# Patient Record
Sex: FEMALE | Race: WHITE | Employment: FULL TIME | URBAN - METROPOLITAN AREA
[De-identification: names, ages, dates, MRNs, and addresses within clinical notes are randomized per-mention and may not be internally consistent; named-entity substitution may affect disease eponyms.]

---

## 2017-05-31 ENCOUNTER — PRE VISIT (OUTPATIENT)
Dept: OTOLARYNGOLOGY | Facility: CLINIC | Age: 47
End: 2017-05-31

## 2017-05-31 NOTE — TELEPHONE ENCOUNTER
1.  Date/reason for appt: 7/13/17 7:15AM - Lump in Throat/Lesion  2.  Referring provider: self-referred  3.  Call to patient (Yes / No - short description): Yes, pt transferred from call center  4.  Previous care at / records requested from: [facility] (pictures taken of her lesions here) -- NEED PARTH    Emailed PARTH form to pt: [email or web address]

## 2017-06-24 ENCOUNTER — HEALTH MAINTENANCE LETTER (OUTPATIENT)
Age: 47
End: 2017-06-24

## 2019-01-16 ENCOUNTER — TELEPHONE (OUTPATIENT)
Dept: DERMATOLOGY | Facility: CLINIC | Age: 49
End: 2019-01-16

## 2019-01-16 NOTE — TELEPHONE ENCOUNTER
Dermatology Pre-visit Call:    Reason for visit : Concerning area on arm     Any personal history of skin cancers: No    Was the patient referred: Yes, her PCP 8 months ago    If the patient was referred, are records obtained: No. (If no, then obtain records).    Has the patient seen a dermatologist in the past: No. (If yes, obtain records)    Patient Reminders Given:  --Please, make sure you bring an updated list of your medications.   --Plan on being in our facility for approximately one hour, this includes the registration process, office visit, education and check-out process.  If you are having a procedure, more time may be required.     --If you are having a procedure, please, present 15 minutes early.  --Location reviewed.   --If you need to cancel or reschedule, call [phone]  --We look forward to seeing you in Dermatology Clinic.

## 2019-01-23 ENCOUNTER — OFFICE VISIT (OUTPATIENT)
Dept: DERMATOLOGY | Facility: CLINIC | Age: 49
End: 2019-01-23
Payer: COMMERCIAL

## 2019-01-23 DIAGNOSIS — L20.89 OTHER ATOPIC DERMATITIS: Primary | ICD-10-CM

## 2019-01-23 RX ORDER — FLUOCINONIDE 0.5 MG/G
OINTMENT TOPICAL
Qty: 60 G | Refills: 3 | Status: SHIPPED | OUTPATIENT
Start: 2019-01-23 | End: 2021-10-07

## 2019-01-23 ASSESSMENT — PAIN SCALES - GENERAL: PAINLEVEL: NO PAIN (0)

## 2019-01-23 NOTE — LETTER
1/23/2019       RE: Brittanie Tavares  [address] [address]     Dear Colleague,    Thank you for referring your patient, Brittanie Tavares, to the [facility] DERMATOLOGY at [facility]. Please see a copy of my visit note below.    [facility] Dermatology Note      Dermatology Problem List:  1. Atopic dermatitis  2. 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 States she's had it for about 2 years.         Encounter Date: Jan 23, 2019    History of Present Illness:  Ms. Brittanie Tavares is a 48 year old female who with no history of skin cancer presents with an itchy spot of the right forearm for the past 2 years.  She has similar smaller patches that appear on the face.  She uses 1% hydrocortisone cream as needed to the rash areas.  It helps the face but not the arm.  She has dry cracking hands and feet.  No personal history of atopy but her brother has sinus issues.  She uses  [place]anger[place] body wash (but not on her face) and Eucerin Hand Cream on the hands but is not good at using moisturizers.  She also notes an asymptomatic blue mole on her left back present for at least 3 years without change.  A nurse recommended she have it looked at.    Past Medical History:   Patient Active Problem List   Diagnosis     Menorrhagia     Fibroids, intramural     Past Medical History:   Diagnosis Date     Anesthesia complication     Slow to awaken     Fibroids      Infertility      Sleep apnea     not formally diagnosed--was noted to have decreased O2 sats when sleeping      Past Surgical History:   Procedure Laterality Date     C TOTAL HIP ARTHROPLASTY      left     C/SECTION, CLASSICAL  1998     DILATE CERVIX, ABLATE ENDOMETRIUM HYDROTHERMAL, COMBINED N/A 2/17/2015    Procedure: COMBINED DILATE CERVIX, ABLATE  ENDOMETRIUM HYDROTHERMAL;  Surgeon: Viviana Varela MD;  [facility]     HYSTERECTOMY TOTAL ABDOMINAL N/A 3/13/2015    Procedure: HYSTERECTOMY TOTAL ABDOMINAL;  Surgeon: Viviana Varela MD;  [facility]     hysteroscopic myomectomy  2010     SALPINGECTOMY Bilateral 3/13/2015    Procedure: SALPINGECTOMY;  Surgeon: Viviana Varela MD;  [facility]       Social History:  Patient reports that she has quit smoking. she has never used smokeless tobacco. She reports that she drinks about 0.5 - 2.5 oz of alcohol per week. She reports that she does not use drugs.    Family History:  Family History   Problem Relation Age of Onset     Breast Cancer Mother      Myocardial Infarction Maternal Grandmother      Diabetes No family hx of      Coronary Artery Disease No family hx of      Hypertension No family hx of      Melanoma No family hx of      Skin Cancer No family hx of        Medications:  Current Outpatient Medications   Medication Sig Dispense Refill     hydrocortisone 1 % CREA cream daily       No Known Allergies          Physical exam:  Vitals: LMP 03/13/2015   GEN: This is a well developed, well-nourished female in no acute distress, in a pleasant mood.    SKIN: Focused examination of the face, arms, hands and back was performed.  -There are pink scaly patches and plaques on the chin and left cheek with a lichenified plaque of the right forearm.  -Xerosis of the hands with fissures  -firm white papule with blue hue left posterior shoulder  -few scattered benign appearing nevi of the back  -No other lesions of concern on areas examined.     Impression/Plan:  1. Eczematous dermatitis    Discussed gentle skin care- daily emollient, mild soaps    1% hydrocortisone cream daily prn face rash    Lidex ointment at night with cotton gloves and socks to hands and feet with saran wrap occlusion to the right forearm LSC plaque    2. 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  Follow-up in 3 months, earlier for new or changing lesions.     Staff Involved:  Staff Only    Again, thank you for allowing me to participate in the care of your patient.      Sincerely,  Maribell Quiles MD

## 2019-01-23 NOTE — PROGRESS NOTES
[facility] Dermatology Note      Dermatology Problem List:  1. Atopic dermatitis  2. 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 States she's had it for about 2 years.         Encounter Date: Jan 23, 2019    History of Present Illness:  Ms. Brittanie Tavares is a 48 year old female who with no history of skin cancer presents with an itchy spot of the right forearm for the past 2 years.  She has similar smaller patches that appear on the face.  She uses 1% hydrocortisone cream as needed to the rash areas.  It helps the face but not the arm.  She has dry cracking hands and feet.  No personal history of atopy but her brother has sinus issues.  She uses  [place] Tangerine body wash (but not on her face) and Eucerin Hand Cream on the hands but is not good at using moisturizers.  She also notes an asymptomatic blue mole on her left back present for at least 3 years without change.  A nurse recommended she have it looked at.    Past Medical History:   Patient Active Problem List   Diagnosis     Menorrhagia     Fibroids, intramural     Past Medical History:   Diagnosis Date     Anesthesia complication     Slow to awaken     Fibroids      Infertility      Sleep apnea     not formally diagnosed--was noted to have decreased O2 sats when sleeping      Past Surgical History:   Procedure Laterality Date     C TOTAL HIP ARTHROPLASTY      left     C/SECTION, CLASSICAL  1998     DILATE CERVIX, ABLATE ENDOMETRIUM HYDROTHERMAL, COMBINED N/A 2/17/2015    Procedure: COMBINED DILATE CERVIX, ABLATE ENDOMETRIUM HYDROTHERMAL;  Surgeon: Viviana Varela MD;  [facility]     HYSTERECTOMY TOTAL ABDOMINAL N/A 3/13/2015    Procedure: HYSTERECTOMY TOTAL ABDOMINAL;  Surgeon: Viviana Varela MD;  [facility]     hysteroscopic myomectomy  2010     SALPINGECTOMY Bilateral 3/13/2015     Procedure: SALPINGECTOMY;  Surgeon: Viviana Varela MD;  [facility]       Social History:  Patient reports that she has quit smoking. she has never used smokeless tobacco. She reports that she drinks about 0.5 - 2.5 oz of alcohol per week. She reports that she does not use drugs.    Family History:  Family History   Problem Relation Age of Onset     Breast Cancer Mother      Myocardial Infarction Maternal Grandmother      Diabetes No family hx of      Coronary Artery Disease No family hx of      Hypertension No family hx of      Melanoma No family hx of      Skin Cancer No family hx of        Medications:  Current Outpatient Medications   Medication Sig Dispense Refill     hydrocortisone 1 % CREA cream daily       No Known Allergies          Physical exam:  Vitals: LMP 03/13/2015   GEN: This is a well developed, well-nourished female in no acute distress, in a pleasant mood.    SKIN: Focused examination of the face, arms, hands and back was performed.  -There are pink scaly patches and plaques on the chin and left cheek with a lichenified plaque of the right forearm.  -Xerosis of the hands with fissures  -firm white papule with blue hue left posterior shoulder  -few scattered benign appearing nevi of the back  -No other lesions of concern on areas examined.     Impression/Plan:  1. Eczematous dermatitis    Discussed gentle skin care- daily emollient, mild soaps    1% hydrocortisone cream daily prn face rash    Lidex ointment at night with cotton gloves and socks to hands and feet with saran wrap occlusion to the right forearm LSC plaque      2. 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  Follow-up in 3 months, earlier for new or changing lesions.       Staff Involved:  Staff Only

## 2019-01-23 NOTE — PATIENT INSTRUCTIONS
Mild unscented soap like Dove unscented, Vanicream soap, unscented glycerin    Daily moisturizer like Vanicream, CeraVe, Eucerin    1% hydrocortisone cream for the face daily for rash  Fluocinonide ointment at bedtime under occlusion (cotton gloves, socks, or plastic wrap) for rash on body  Avoid fabric softeners or dryer sheets

## 2019-01-23 NOTE — NURSING NOTE
Dermatology Rooming Note    Brittanie Tavares's goals for this visit include:   Chief Complaint   Patient presents with     Derm Problem     Brittanie is here for a concerning area on her right forearm. States she's had it for about 2 years.       Anna Landaverde LPN

## 2019-03-13 ENCOUNTER — DOCUMENTATION ONLY (OUTPATIENT)
Dept: CARE COORDINATION | Facility: CLINIC | Age: 49
End: 2019-03-13

## 2019-10-02 ENCOUNTER — HEALTH MAINTENANCE LETTER (OUTPATIENT)
Age: 49
End: 2019-10-02

## 2019-12-27 ENCOUNTER — TELEPHONE (OUTPATIENT)
Dept: DERMATOLOGY | Facility: CLINIC | Age: 49
End: 2019-12-27

## 2019-12-27 NOTE — TELEPHONE ENCOUNTER
[facility] Call Center    Phone Message    May a detailed message be left on voicemail: yes    Reason for Call: Pt wondering if she needs to be seen, or if something could be prescribed for lesions/spots on face. Per Pt, she was prescribed a hydrocortisone cream for her face and has been using it for the last year, but it isn't working. Wondering if Dr. Quiles would be willing to prescribe something different? Pt did schedule first available appointment with Dr. Quiles in March and is added to Wait List as well. Please call.     Action Taken: Message routed to:  [facility] Dermatology Clinic

## 2019-12-27 NOTE — TELEPHONE ENCOUNTER
"Called pt back. Pt stated that she wanted to see Dr. Quiles sooner than March. Told pt that she does not have anything sooner but I could schedule her sooner with another provider to address her concerns. Pt declined and said she only wants to see Dr. Quiles and would like another prescription to try. I informed pt that she would need to be seen in clinic, as she has not been seen in 6 months. Dr. Quiles's note says \"Follow-up in 3 months, earlier for new or changing lesions.\" Stated that I could route this to Dr. Quiles so that she is informed of pt concerns, but she is not in clinic today. Pt said to do that and hung up phone.     Will route to Dr. Quiles and team.   "

## 2019-12-30 NOTE — TELEPHONE ENCOUNTER
Left voicemail asking to call clinic back, clinic number provided.    Dr. Quiles says patient can be seen in acc, at this time there is an opening next week.    Anna Landaverde LPN

## 2020-01-06 ENCOUNTER — OFFICE VISIT (OUTPATIENT)
Dept: DERMATOLOGY | Facility: CLINIC | Age: 50
End: 2020-01-06
Payer: COMMERCIAL

## 2020-01-06 DIAGNOSIS — L20.89 OTHER ATOPIC DERMATITIS: Primary | ICD-10-CM

## 2020-01-06 RX ORDER — HYDROCORTISONE 25 MG/G
OINTMENT TOPICAL 2 TIMES DAILY
Qty: 30 G | Refills: 6 | Status: SHIPPED | OUTPATIENT
Start: 2020-01-06 | End: 2020-10-06

## 2020-01-06 ASSESSMENT — PAIN SCALES - GENERAL: PAINLEVEL: NO PAIN (0)

## 2020-01-06 NOTE — LETTER
1/6/2020       RE: Brittanie Tavares  [address] [address]     Dear Colleague,    Thank you for referring your patient, Brittanie Tavares, to the [facility] DERMATOLOGY at [facility]. Please see a copy of my visit note below.    [facility] Dermatology Note      Dermatology Problem List:  1. Atopic dermatitis  2. 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          Encounter Date: Jan 6, 2020    History of Present Illness:  Ms. Brittanie Tavares is a 49 year old female who returns for follow up of atopic dermatitis.  She did well treating the body area with lidex ointment and it cleared but the face continues to flare despite using 1% hydrocortisone cream almost daily for the past year.  She uses Vanicream for her emollient and is not using soap on her face.      Past Medical History:   Patient Active Problem List   Diagnosis     Menorrhagia     Fibroids, intramural     Past Medical History:   Diagnosis Date     Anesthesia complication     Slow to awaken     Fibroids      Infertility      Sleep apnea     not formally diagnosed--was noted to have decreased O2 sats when sleeping      Past Surgical History:   Procedure Laterality Date     C TOTAL HIP ARTHROPLASTY      left     C/SECTION, CLASSICAL  1998     DILATE CERVIX, ABLATE ENDOMETRIUM HYDROTHERMAL, COMBINED N/A 2/17/2015    Procedure: COMBINED DILATE CERVIX, ABLATE ENDOMETRIUM HYDROTHERMAL;  Surgeon: Viviana Varela MD;  [facility]     HYSTERECTOMY TOTAL ABDOMINAL N/A 3/13/2015    Procedure: HYSTERECTOMY TOTAL ABDOMINAL;  Surgeon: Viviana Varela MD;  [facility]     hysteroscopic myomectomy  2010     SALPINGECTOMY Bilateral 3/13/2015    Procedure: SALPINGECTOMY;  Surgeon: Viviana Varela MD;  [facility]       Social  History:  Patient reports that she has quit smoking. She has never used smokeless tobacco. She reports current alcohol use of about 0.8 - 4.2 standard drinks of alcohol per week. She reports that she does not use drugs.    Family History:  Family History   Problem Relation Age of Onset     Breast Cancer Mother      Myocardial Infarction Maternal Grandmother      Diabetes No family hx of      Coronary Artery Disease No family hx of      Hypertension No family hx of      Melanoma No family hx of      Skin Cancer No family hx of        Medications:  Current Outpatient Medications   Medication Sig Dispense Refill     hydrocortisone 1 % CREA cream daily       fluocinonide (LIDEX) 0.05 % external ointment At bedtime under occlusion to rash on hands, feet and arms (Patient not taking: Reported on 1/6/2020) 60 g 3     No Known Allergies          Physical exam:  Vitals: LMP 03/13/2015   GEN: This is a well developed, well-nourished female in no acute distress, in a pleasant mood.    SKIN: Focused examination of the face and arm was performed.  -Caputo skin type: I  -There are pink scaly patches and plaques on the lips and cheeks.  -No other lesions of concern on areas examined.     Impression/Plan:  1. Eczematous dermatitis of the face  - She did very well with the treatment of the body and is doing gentle skin care with emollients.  We did discuss that lidex is too strong for the face.    - Will start with 2.5% hydrocortisone ointment twice daily for the face (reviewed steroid side effects on face, including glaucoma and cataracts).  I asked her to give it 2 weeks and if not improving, she will send a [employer] message to let me know and then we will send Protopic 0.1% ointment twice daily (reviewed burning/stinging, cost, and Black Box warning]    CC Maribell Quiles MD  [address] [facility] [address]  [place] [address] on close of this encounter.  Follow-up in 1 year, earlier for new or changing lesions.       Staff  Involved:  Staff Only    Again, thank you for allowing me to participate in the care of your patient.      Sincerely,    Maribell Quiles MD

## 2020-01-06 NOTE — NURSING NOTE
Dermatology Rooming Note    Brittanie Tavares's goals for this visit include:   Chief Complaint   Patient presents with     Derm Problem     Brittanie is here for a follow up for her face, states it's worse.        Anna Landaverde LPN

## 2020-01-06 NOTE — PATIENT INSTRUCTIONS
Preventive Care:    Breast Cancer Screening: During our visit today, we discussed that it is recommended you receive breast cancer screening. Please call or make an appointment with your primary care provider to discuss this with them. You may also call the  [facility] scheduling line ([phone]) to set up a mammography appointment at the [facility] within the [facility].    Preventive Care:    Colorectal Cancer Screening: During our visit today, we discussed that it is recommended you receive colorectal cancer screening. Please call or make an appointment with your primary care provider to discuss this. You may also call the  [facility] scheduling line ([phone]) to set up a colonoscopy appointment.

## 2020-01-06 NOTE — PROGRESS NOTES
[facility] Dermatology Note      Dermatology Problem List:  1. Atopic dermatitis  2. 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          Encounter Date: Jan 6, 2020    History of Present Illness:  Ms. Brittanie Tavares is a 49 year old female who returns for follow up of atopic dermatitis.  She did well treating the body area with lidex ointment and it cleared but the face continues to flare despite using 1% hydrocortisone cream almost daily for the past year.  She uses Vanicream for her emollient and is not using soap on her face.      Past Medical History:   Patient Active Problem List   Diagnosis     Menorrhagia     Fibroids, intramural     Past Medical History:   Diagnosis Date     Anesthesia complication     Slow to awaken     Fibroids      Infertility      Sleep apnea     not formally diagnosed--was noted to have decreased O2 sats when sleeping      Past Surgical History:   Procedure Laterality Date     C TOTAL HIP ARTHROPLASTY      left     C/SECTION, CLASSICAL  1998     DILATE CERVIX, ABLATE ENDOMETRIUM HYDROTHERMAL, COMBINED N/A 2/17/2015    Procedure: COMBINED DILATE CERVIX, ABLATE ENDOMETRIUM HYDROTHERMAL;  Surgeon: Viviana Varela MD;  [facility]     HYSTERECTOMY TOTAL ABDOMINAL N/A 3/13/2015    Procedure: HYSTERECTOMY TOTAL ABDOMINAL;  Surgeon: Viviana Varela MD;  [facility]     hysteroscopic myomectomy  2010     SALPINGECTOMY Bilateral 3/13/2015    Procedure: SALPINGECTOMY;  Surgeon: Viviana Varela MD;  [facility]       Social History:  Patient reports that she has quit smoking. She has never used smokeless tobacco. She reports current alcohol use of about 0.8 - 4.2 standard drinks of alcohol per week. She reports that she does not use drugs.    Family History:  Family History   Problem Relation Age of Onset      Breast Cancer Mother      Myocardial Infarction Maternal Grandmother      Diabetes No family hx of      Coronary Artery Disease No family hx of      Hypertension No family hx of      Melanoma No family hx of      Skin Cancer No family hx of        Medications:  Current Outpatient Medications   Medication Sig Dispense Refill     hydrocortisone 1 % CREA cream daily       fluocinonide (LIDEX) 0.05 % external ointment At bedtime under occlusion to rash on hands, feet and arms (Patient not taking: Reported on 1/6/2020) 60 g 3     No Known Allergies          Physical exam:  Vitals: LMP 03/13/2015   GEN: This is a well developed, well-nourished female in no acute distress, in a pleasant mood.    SKIN: Focused examination of the face and arm was performed.  -Caputo skin type: I  -There are pink scaly patches and plaques on the lips and cheeks.  -No other lesions of concern on areas examined.     Impression/Plan:  1. Eczematous dermatitis of the face  - She did very well with the treatment of the body and is doing gentle skin care with emollients.  We did discuss that lidex is too strong for the face.    - Will start with 2.5% hydrocortisone ointment twice daily for the face (reviewed steroid side effects on face, including glaucoma and cataracts).  I asked her to give it 2 weeks and if not improving, she will send a [employer] message to let me know and then we will send Protopic 0.1% ointment twice daily (reviewed burning/stinging, cost, and Black Box warning]        CC Maribell Quiles MD  [address]  [place] [address] on close of this encounter.  Follow-up in 1 year, earlier for new or changing lesions.       Staff Involved:  Staff Only

## 2020-01-15 ENCOUNTER — DOCUMENTATION ONLY (OUTPATIENT)
Dept: CARE COORDINATION | Facility: CLINIC | Age: 50
End: 2020-01-15

## 2020-02-10 ENCOUNTER — ANCILLARY PROCEDURE (OUTPATIENT)
Dept: MAMMOGRAPHY | Facility: CLINIC | Age: 50
End: 2020-02-10
Attending: STUDENT IN AN ORGANIZED HEALTH CARE EDUCATION/TRAINING PROGRAM
Payer: COMMERCIAL

## 2020-02-10 DIAGNOSIS — Z12.31 VISIT FOR SCREENING MAMMOGRAM: ICD-10-CM

## 2020-02-10 PROCEDURE — 77063 BREAST TOMOSYNTHESIS BI: CPT | Performed by: RADIOLOGY

## 2020-02-10 PROCEDURE — 77067 SCR MAMMO BI INCL CAD: CPT | Performed by: RADIOLOGY

## 2020-08-18 ENCOUNTER — TELEPHONE (OUTPATIENT)
Dept: ORTHOPEDICS | Facility: CLINIC | Age: 50
End: 2020-08-18

## 2020-08-18 NOTE — TELEPHONE ENCOUNTER
I left a message for the patient offering and earlier appt. With a different sports med provider Dr. Merida on Thursday Sep. 3rd. A call back number was given.     RAHUL Phipps

## 2020-09-03 ENCOUNTER — NURSE TRIAGE (OUTPATIENT)
Dept: NURSING | Facility: CLINIC | Age: 50
End: 2020-09-03

## 2020-09-03 NOTE — TELEPHONE ENCOUNTER
Caller called to find out what anti-inflammatory medication will be gentle on her stomach.  States that she has a shoulder issue, has been taking ibuprofen, noted that this is too harsh on her stomach,  Caller states that she has an appointment next week for the shoulder issue-declined triage at this time.  This triage nurse advised patient to call pharmacy about this and should call back if her condition worsens  Yenni Santamaria RN    Additional Information    Negative: Drug overdose and triager unable to answer question    Negative: Caller requesting information unrelated to medicine    Negative: Caller requesting a prescription for Strep throat and has a positive culture result    Negative: Rash while taking a medication or within 3 days of stopping it    Negative: Immunization reaction suspected    Negative: Asthma and having symptoms of asthma (cough, wheezing, etc.)    Negative: Breastfeeding questions about mother's medicines and diet    Negative: MORE THAN A DOUBLE DOSE of a prescription or over-the-counter (OTC) drug    Negative: DOUBLE DOSE (an extra dose or lesser amount) of over-the-counter (OTC) drug and any symptoms (e.g., dizziness, nausea, pain, sleepiness)    Negative: DOUBLE DOSE (an extra dose or lesser amount) of prescription drug and any symptoms (e.g., dizziness, nausea, pain, sleepiness)    Negative: Took another person's prescription drug    Negative: DOUBLE DOSE (an extra dose or lesser amount) of prescription drug and NO symptoms (Exception: a double dose of antibiotics)    Negative: Diabetes drug error or overdose (e.g., took wrong type of insulin or took extra dose)    Caller has medication question about med not prescribed by PCP and triager unable to answer question (e.g., compatibility with other med, storage)    Commented on: Caller has medication question, adult has minor symptoms, caller declines triage, and triager answers question     Regarding anti-inflammatory over the counter  medications    Protocols used: MEDICATION QUESTION CALL-A-OH

## 2020-09-08 DIAGNOSIS — M25.511 RIGHT SHOULDER PAIN, UNSPECIFIED CHRONICITY: Primary | ICD-10-CM

## 2020-09-09 ENCOUNTER — OFFICE VISIT (OUTPATIENT)
Dept: ORTHOPEDICS | Facility: CLINIC | Age: 50
End: 2020-09-09
Payer: COMMERCIAL

## 2020-09-09 ENCOUNTER — PRE VISIT (OUTPATIENT)
Dept: ORTHOPEDICS | Facility: CLINIC | Age: 50
End: 2020-09-09

## 2020-09-09 ENCOUNTER — ANCILLARY PROCEDURE (OUTPATIENT)
Dept: GENERAL RADIOLOGY | Facility: CLINIC | Age: 50
End: 2020-09-09
Payer: COMMERCIAL

## 2020-09-09 DIAGNOSIS — G89.29 CHRONIC RIGHT SHOULDER PAIN: Primary | ICD-10-CM

## 2020-09-09 DIAGNOSIS — M54.2 NECK PAIN: ICD-10-CM

## 2020-09-09 DIAGNOSIS — M25.511 RIGHT SHOULDER PAIN, UNSPECIFIED CHRONICITY: ICD-10-CM

## 2020-09-09 DIAGNOSIS — M25.511 CHRONIC RIGHT SHOULDER PAIN: Primary | ICD-10-CM

## 2020-09-09 NOTE — PROGRESS NOTES
Subjective:   Brittanie Tavares is a 50 year old LHD female who presents with right posterior shoulder pain.  Just kind of came on.  Can't pin point injury.  Repetitive injury, so stopped FB.  Pt reports taking ibuprofen and thought it was taking the inflammation done, ulcer induced.  Can lift overhead, hard to do bra, sunscreen on back, Full ROM put can   Pt with neck pain and numbness biking.  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  Coccyx pain, worse with raising from sitting.  Getting better this week.  Ordered pillow, divot in back.    Background:   Date of injury: None  Duration of symptoms: 5 months  Mechanism of Injury: Insidious Onset; Unknown   Intensity: 3/10 at rest, 7/10 sharp pain with activity   Aggravating factors: Sleeping right and left side, internal rotation, horizontal adduction  Relieving Factors: Ibuprofen   Prior Evaluation: None    PAST MEDICAL, SOCIAL, SURGICAL AND FAMILY HISTORY: She  has a past medical history of Anesthesia complication, Fibroids, Infertility, and Sleep apnea. She also has no past medical history of Basal cell carcinoma, Malignant melanoma (H), or Squamous cell carcinoma.  She  has a past surgical history that includes TOTAL HIP ARTHROPLASTY; hysteroscopic myomectomy (2010); c/section, classical (1998); Dilate cervix, ablate endometrium hydrothermal, combined (N/A, 2/17/2015); Hysterectomy total abdominal (N/A, 3/13/2015); and Salpingectomy (Bilateral, 3/13/2015).  Her family history includes Breast Cancer in her mother; Myocardial Infarction in her maternal grandmother.  She reports that she has quit smoking. She has never used smokeless tobacco. She reports current alcohol use of about 0.8 - 4.2 standard drinks of alcohol per week. She reports that she does not use drugs.    ALLERGIES: She has No Known Allergies.    CURRENT MEDICATIONS: She has a current medication list which includes the following prescription(s): betamethasone  dipropionate, fluocinolone, fluocinonide, hydrocortisone, and hydrocortisone.     REVIEW OF SYSTEMS: 10 point review of systems is negative except as noted above.     Exam:   LMP 03/13/2015            CONSTITUTIONAL: healthy, alert, no distress and cooperative  HEAD: Normocephalic. No masses, lesions, tenderness or abnormalities  SKIN: no suspicious lesions or rashes  GAIT: normal  NEUROLOGIC: Non-focal, Normal muscle tone and strength, reflexes normal, sensation grossly normal.  PSYCHIATRIC: affect normal/bright and mentation appears normal.    MUSCULOSKELETAL: right shoulder pain,     Palpation:  Non-tender SC joint, clavicle, AC joint, acromion, subacromial space, proximal bicep tendon   Tender:  upper trapezius muscle, posterior shoulder pain, worse with push-off - subscapularis  Range of Motion        Active: decreased due to pain, noted rounded posture        Passive: mild reduction in motion due to pain, +lift-off  Strength: rotator cuff strength mildly decreased  Special tests: Negative Neer's test, Negative Monsalve, Negative Cross-Arm adduction, Equivical Gallo's  Neck ROM intact, -Spurling's, pain posterior spinous processes     Assessment/Plan:   Patient is a 50-year-old white female with past medical history of no known shoulder trauma presenting with right shoulder pain  1.  Right shoulder pain-concern for rotator cuff arthropathy  Patient agreeable to go to physical therapy for strengthening  Consider MRI if not improving  2.  Neck pain-  I think this can also be addressed with going to physical therapy  3.  Coccyx pain-  This is recently been getting worse from sitting and possibly bike riding.  The patient has not had bike fit assessed.  Since she will be attending physical therapy I selected [facility] will also does like that and she is more than willing to bring her equipment and to have this assessed    RTC after 2 months of PT  X-RAY INTERPRETATION:   X-Ray of the Right Shoulder: 3-view, ap, y,  axillary  ordered and interpreted in the office today was negative for fracture, subluxation or joint space abnormality.

## 2020-09-09 NOTE — LETTER
9/9/2020      RE: Brittanie Tavares  [address] [address] [address]        Subjective:   Brittanie Tavares is a 50 year old LHD female who presents with right posterior shoulder pain.  Just kind of came on.  Can't pin point injury.  Repetitive injury, so stopped FB.  Pt reports taking ibuprofen and thought it was taking the inflammation done, ulcer induced.  Can lift overhead, hard to do bra, sunscreen on back, Full ROM put can   Pt with neck pain and numbness biking.  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  Coccyx pain, worse with raising from sitting.  Getting better this week.  Ordered pillow, divot in back.    Background:   Date of injury: None  Duration of symptoms: 5 months  Mechanism of Injury: Insidious Onset; Unknown   Intensity: 3/10 at rest, 7/10 sharp pain with activity   Aggravating factors: Sleeping right and left side, internal rotation, horizontal adduction  Relieving Factors: Ibuprofen   Prior Evaluation: None    PAST MEDICAL, SOCIAL, SURGICAL AND FAMILY HISTORY: She  has a past medical history of Anesthesia complication, Fibroids, Infertility, and Sleep apnea. She also has no past medical history of Basal cell carcinoma, Malignant melanoma (H), or Squamous cell carcinoma.  She  has a past surgical history that includes TOTAL HIP ARTHROPLASTY; hysteroscopic myomectomy (2010); c/section, classical (1998); Dilate cervix, ablate endometrium hydrothermal, combined (N/A, 2/17/2015); Hysterectomy total abdominal (N/A, 3/13/2015); and Salpingectomy (Bilateral, 3/13/2015).  Her family history includes Breast Cancer in her mother; Myocardial Infarction in her maternal grandmother.  She reports that she has quit smoking. She has never used smokeless tobacco. She reports current alcohol use of about 0.8 - 4.2 standard drinks of alcohol per week. She reports that she does not use drugs.    ALLERGIES: She has No Known Allergies.    CURRENT MEDICATIONS: She  has a current medication list which includes the following prescription(s): betamethasone dipropionate, fluocinolone, fluocinonide, hydrocortisone, and hydrocortisone.     REVIEW OF SYSTEMS: 10 point review of systems is negative except as noted above.     Exam:   LMP 03/13/2015            CONSTITUTIONAL: healthy, alert, no distress and cooperative  HEAD: Normocephalic. No masses, lesions, tenderness or abnormalities  SKIN: no suspicious lesions or rashes  GAIT: normal  NEUROLOGIC: Non-focal, Normal muscle tone and strength, reflexes normal, sensation grossly normal.  PSYCHIATRIC: affect normal/bright and mentation appears normal.    MUSCULOSKELETAL: right shoulder pain,     Palpation:  Non-tender SC joint, clavicle, AC joint, acromion, subacromial space, proximal bicep tendon   Tender:  upper trapezius muscle, posterior shoulder pain, worse with push-off - subscapularis  Range of Motion        Active: decreased due to pain, noted rounded posture        Passive: mild reduction in motion due to pain, +lift-off  Strength: rotator cuff strength mildly decreased  Special tests: Negative Neer's test, Negative Monsalev, Negative Cross-Arm adduction, Equivical Gallo's  Neck ROM intact, -Spurling's, pain posterior spinous processes     Assessment/Plan:   Patient is a 50-year-old white female with past medical history of no known shoulder trauma presenting with right shoulder pain  1.  Right shoulder pain-concern for rotator cuff arthropathy  Patient agreeable to go to physical therapy for strengthening  Consider MRI if not improving  2.  Neck pain-  I think this can also be addressed with going to physical therapy  3.  Coccyx pain-  This is recently been getting worse from sitting and possibly bike riding.  The patient has not had bike fit assessed.  Since she will be attending physical therapy I selected [facility] will also does like that and she is more than willing to bring her equipment and to have this assessed    RTC  after 2 months of PT  X-RAY INTERPRETATION:   X-Ray of the Right Shoulder: 3-view, ap, y, axillary  ordered and interpreted in the office today was negative for fracture, subluxation or joint space abnormality.    Isabella Rosenbaum MD

## 2020-09-21 ENCOUNTER — THERAPY VISIT (OUTPATIENT)
Dept: PHYSICAL THERAPY | Facility: CLINIC | Age: 50
End: 2020-09-21
Attending: FAMILY MEDICINE
Payer: COMMERCIAL

## 2020-09-21 DIAGNOSIS — M25.511 CHRONIC RIGHT SHOULDER PAIN: ICD-10-CM

## 2020-09-21 DIAGNOSIS — G89.29 CHRONIC RIGHT SHOULDER PAIN: ICD-10-CM

## 2020-09-21 DIAGNOSIS — M54.2 NECK PAIN: ICD-10-CM

## 2020-09-21 PROCEDURE — 97161 PT EVAL LOW COMPLEX 20 MIN: CPT | Mod: GP | Performed by: PHYSICAL THERAPIST

## 2020-09-21 PROCEDURE — 97110 THERAPEUTIC EXERCISES: CPT | Mod: GP | Performed by: PHYSICAL THERAPIST

## 2020-09-21 NOTE — PROGRESS NOTES
[facility] Initial Evaluation  Subjective:  [facility]                  Physical Therapy Initial Examination/Evaluation  September 21, 2020    Brittanie Tavares is a 50 year old female referred to physical therapy for treatment of chronic right shoulder and neck pain with Precautions/Restrictions/MD instructions none  Patient has been sewing a bit   advil-hx of ulcers    Subjective:  DOI/onset: 5/1/20   Acute Injury or Gradual Onset?: Gradual injury over time  Mechanism of Injury: unknown  Related PMH: left WILFREDO  Chief Complaint/Functional Limitations:   Reaching behind back to clasp bra, sleeping on side causes the pain and see below in therapy evaluation codes   Pain: working/rest 2 /10, activity 9/10 Location: post/lateral shoulder Frequency: Constant Described as: aching and sharp Alleviated by: Nothing Progression of Symptoms: Gradually getting better. Time of day when pain is worse: Activity related and Position related  Sleeping: Interrupted due to current issue   Occupation: tech  Job duties: keyboarding/computer use  Current HEP/exercise regimen: biking, 30-40 miles/week, don't ride in the winter  Patient's goals are see chief complaints control shoulder pain    Other pertinent PMH/Red Flags: left WILFREDO   Barriers at home/work: none  Pertinent Surgical History: see above  Medications: see above  General health as reported by patient: good  Return to MD:  Not at this time      Objective:  System          Objective:    Off the Bike measures, as indicated    Flexibility Screen:    Right Left   pec major + +   Post capsule + +   Hamstring     Hip Flexor     ITB/Lat Hip in SL     Quadriceps     Gastroc/ Soleus     - = normal  + = mild tightness  ++ = moderate tightness  +++ = significant tightness       Static bike measures measurements:    Seat Level: measure off  if possible Initial ( -11.5degrees) Post  ( -1.5degrees)               Knee Left/right    Knee flex angle (seat height) 35 degrees/mm        Seat Position  Seat Fore/aft (slightly ant positioning,        Trunk angle 85   Shoulder angle (humerus, T1-7 with axis at GH, goal around 90)    Shoulder width (Good width-some difficulty reaching breaks)      left right   Elbow (flexion angle) 5 degrees 5 degrees   Wrist position:       Stem sizing occurred 80 mm length and +20 rise        Clinical assessment and changes:    Pt present with persistent shoulder pain RTC tendinopathy.  Pts bike set up and sewing are aggravating factors.  Pt had an overly tilted seat and not as high of an angled stem.  Adjustments were made and recommendations described.  Pt will be a great candidate for follow up in 1 week for progression of shoulder exercises.  Pt provided cross body add stretch 2 x 20 seconds 2 times a day      Shoulder ROM pain at 90 flex but can achieve full motion-right  Pain at 90 with abd  Greatest pain with IR up the back and beltline position vs T8 on the left  MMT: pain with flex>ER> abdright  Negative labral  +impingment        Physical Exam    General     ROS    Assessment/Plan:    Patient is a 50 year old female with right side shoulder complaints.    Patient has the following significant findings with corresponding treatment plan.                Diagnosis 1:  Right RTC tendinopathy  Pain -  hot/cold therapy, manual therapy, self management, education and home program  Decreased strength - therapeutic exercise and therapeutic activities  Decreased function - therapeutic activities  Impaired posture - neuro re-education    Therapy Evaluation Codes:   1) History comprised of:   Personal factors that impact the plan of care:      None.    Comorbidity factors that impact the plan of care are:      None.     Medications impacting care: None.  2) Examination of Body Systems comprised of:   Body structures and functions that impact the plan of care:      Shoulder.   Activity limitations that impact the plan of care are:      Sitting, Sports and  Sleeping.  3) Clinical presentation characteristics are:   Stable/Uncomplicated.  4) Decision-Making    Low complexity using standardized patient assessment instrument and/or measureable assessment of functional outcome.  Cumulative Therapy Evaluation is: Low complexity.    Previous and current functional limitations:  (See Goal Flow Sheet for this information)    Short term and Long term goals: (See Goal Flow Sheet for this information)     Communication ability:  Patient appears to be able to clearly communicate and understand verbal and written communication and follow directions correctly.  Treatment Explanation - The following has been discussed with the patient:   RX ordered/plan of care  Anticipated outcomes  Possible risks and side effects  This patient would benefit from PT intervention to resume normal activities.   Rehab potential is good.    Frequency:  1 X week, once daily  Duration:  for 8 weeks  Discharge Plan:  Achieve all LTG.  Independent in home treatment program.  Reach maximal therapeutic benefit.    Please refer to the daily flowsheet for treatment today, total treatment time and time spent performing 1:1 timed codes.

## 2020-09-29 ENCOUNTER — THERAPY VISIT (OUTPATIENT)
Dept: PHYSICAL THERAPY | Facility: CLINIC | Age: 50
End: 2020-09-29
Payer: COMMERCIAL

## 2020-09-29 DIAGNOSIS — S49.90XA SHOULDER INJURY: Primary | ICD-10-CM

## 2020-09-29 PROCEDURE — 97110 THERAPEUTIC EXERCISES: CPT | Mod: GP | Performed by: PHYSICAL THERAPIST

## 2020-09-29 PROCEDURE — 97140 MANUAL THERAPY 1/> REGIONS: CPT | Mod: GP | Performed by: PHYSICAL THERAPIST

## 2020-10-06 ENCOUNTER — OFFICE VISIT (OUTPATIENT)
Dept: DERMATOLOGY | Facility: CLINIC | Age: 50
End: 2020-10-06
Payer: COMMERCIAL

## 2020-10-06 ENCOUNTER — THERAPY VISIT (OUTPATIENT)
Dept: PHYSICAL THERAPY | Facility: CLINIC | Age: 50
End: 2020-10-06
Payer: COMMERCIAL

## 2020-10-06 DIAGNOSIS — L20.89 OTHER ATOPIC DERMATITIS: ICD-10-CM

## 2020-10-06 DIAGNOSIS — S49.90XA SHOULDER INJURY: Primary | ICD-10-CM

## 2020-10-06 DIAGNOSIS — L21.9 DERMATITIS, SEBORRHEIC: Primary | ICD-10-CM

## 2020-10-06 PROCEDURE — 97140 MANUAL THERAPY 1/> REGIONS: CPT | Mod: GP | Performed by: PHYSICAL THERAPIST

## 2020-10-06 PROCEDURE — 99213 OFFICE O/P EST LOW 20 MIN: CPT | Mod: GC | Performed by: DERMATOLOGY

## 2020-10-06 PROCEDURE — 97110 THERAPEUTIC EXERCISES: CPT | Mod: GP | Performed by: PHYSICAL THERAPIST

## 2020-10-06 RX ORDER — FLUOCINOLONE ACETONIDE 0.11 MG/ML
OIL TOPICAL
Qty: 118 ML | Refills: 2 | Status: SHIPPED | OUTPATIENT
Start: 2020-10-06 | End: 2021-10-07

## 2020-10-06 RX ORDER — KETOCONAZOLE 20 MG/ML
SHAMPOO TOPICAL
Qty: 120 ML | Refills: 5 | Status: SHIPPED | OUTPATIENT
Start: 2020-10-06

## 2020-10-06 RX ORDER — HYDROCORTISONE 25 MG/G
OINTMENT TOPICAL 2 TIMES DAILY
Qty: 30 G | Refills: 6 | Status: SHIPPED | OUTPATIENT
Start: 2020-10-06 | End: 2021-10-07

## 2020-10-06 RX ORDER — KETOCONAZOLE 20 MG/G
CREAM TOPICAL
Qty: 30 G | Refills: 2 | Status: SHIPPED | OUTPATIENT
Start: 2020-10-06 | End: 2021-10-07

## 2020-10-06 ASSESSMENT — PAIN SCALES - GENERAL: PAINLEVEL: NO PAIN (0)

## 2020-10-06 NOTE — NURSING NOTE
Dermatology Rooming Note    Brittanie Tavares's goals for this visit include:   Chief Complaint   Patient presents with     Derm Problem     Brittanie is here for a follow up for dermatitis on her face and scalp.  States it's getting worse.       Anna Landaverde LPN

## 2020-10-06 NOTE — LETTER
10/6/2020       RE: Brittanie Tavares  [address] [facility] [address]     Dear Colleague,    Thank you for referring your patient, Brittanie Tavares, to the [facility] [place] [facility]. Please see a copy of my visit note below.      [facility] Dermatology Note      Dermatology Problem List:  1. Atopic dermatitis   - HC 2.5% ointment  2. Seborrheic dermatitis, possible perioral dermatitis component  - ketoconazole shampoo, ketoconazole 2% cream, fluocinolone 0.01% oil  3. 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  States it's getting worse.       History of Present Illness:  Ms. Brittanie Tavares is a 50 year old female who presents as a follow-up for dermatitis of the face and scalp. The patient was last seen on 1/6/20 when she was prescribed hydrocortisone 2.5% ointment for her dermatitis. She has since done well with this ointment for the body, especially under the occlusion, however has noticed that her dermatitis of the face and scalp has persisted. She was subsequently prescribed fluocinolone solution, however this caused some stinging of the scalp. She reports increased pruritis and flaking of the occipital scalp, brow area, perinasal region and perioral region. These areas seem to worsen with stress.    Past Medical History:   Patient Active Problem List   Diagnosis     Menorrhagia     Fibroids, intramural     Past Medical History:   Diagnosis Date     Anesthesia complication     Slow to awaken     Fibroids      Infertility      Sleep apnea     not formally diagnosed--was noted to have decreased O2 sats when sleeping      Past Surgical History:   Procedure Laterality Date     C TOTAL HIP ARTHROPLASTY      left     C/SECTION, CLASSICAL   1998     DILATE CERVIX, ABLATE ENDOMETRIUM HYDROTHERMAL, COMBINED N/A 2/17/2015    Procedure: COMBINED DILATE CERVIX, ABLATE ENDOMETRIUM HYDROTHERMAL;  Surgeon: Viviana Varela MD;  Location: [facility] OR     HYSTERECTOMY TOTAL ABDOMINAL N/A 3/13/2015    Procedure: HYSTERECTOMY TOTAL ABDOMINAL;  Surgeon: Viviana Varela MD;  Location: [facility] OR     hysteroscopic myomectomy  2010     SALPINGECTOMY Bilateral 3/13/2015    Procedure: SALPINGECTOMY;  Surgeon: Viviana Varela MD;  Location: [facility] OR       Social History:  Patient reports that she has quit smoking. She has never used smokeless tobacco. She reports current alcohol use of about 0.8 - 4.2 standard drinks of alcohol per week. She reports that she does not use drugs.    Family History:  Family History   Problem Relation Age of Onset     Breast Cancer Mother      Myocardial Infarction Maternal Grandmother      Diabetes No family hx of      Coronary Artery Disease No family hx of      Hypertension No family hx of      Melanoma No family hx of      Skin Cancer No family hx of        Medications:  Current Outpatient Medications   Medication Sig Dispense Refill     hydrocortisone 1 % CREA cream daily       hydrocortisone 2.5 % ointment Apply topically 2 times daily 30 g 6     betamethasone dipropionate (DIPROSONE) 0.05 % external lotion Apply topically 2 times daily (Patient not taking: Reported on 10/6/2020) 60 mL 3     fluocinolone (SYNALAR) 0.01 % solution Apply topically 2 times daily (Patient not taking: Reported on 10/6/2020) 60 mL 6     fluocinonide (LIDEX) 0.05 % external ointment At bedtime under occlusion to rash on hands, feet and arms (Patient not taking: Reported on 10/6/2020) 60 g 3        No Known Allergies    Review of Systems:  -Skin Establ Pt: The patient denies any new rash, pruritus, or lesions that are symptomatic, changing or bleeding, except as per HPI.  -Const: Denies fevers, chills or changes in weight.   -Constitutional: Otherwise feeling  well today, in usual state of health.  -HEENT: Patient denies nonhealing oral sores.  -Skin: As above in HPI. No additional skin concerns.    Physical exam:  Vitals: LMP 03/13/2015   GEN: This is a well developed, well-nourished female in no acute distress, in a pleasant mood.    SKIN: Focused examination of the face and scalp was performed.  -Caputo skin type: II  -There is macular erythema with few small pink papules of the brows, perinasal, perioral areas and occipital scalp with mild flaky white scale.  -No other lesions of concern on areas examined.     Impression/Plan:  1. Seborrheic dermatitis with possible perioral dermatitis component  Discussed the etiology of seborrheic dermatitis, including that it is not contagious, and also reviewed available treatment options including topical antifungals and corticosteroids. We also discussed that given the distribution, there is a possibility that she may have a component of perioral dermatitis, however she reports no history of pustules in these areas. We discussed that should she fail to improve with our new treatment plan, she may require a topical antibiotic as well.  - Start ketoconazole 2% shampoo 2-3 times per week  - Start fluocinolone 0.01% oil nightly under occlusion with shower cap for 2 weeks then as needed for acute flares  - Start ketoconazole 2% cream BID on the face  - Consider topical antibiotic if no improvement with this treatment plan    2. Atopic dermatitis, well controlled  - Continue HC 2.5% with or without occlusion BID as needed, refills provided  - Continue gentle skincare    CC Referred Self, MD  No address on file on close of this encounter.    Follow-up in 3 months, earlier for new or changing lesions.     Dr. Henderson staffed the patient.    Staff Involved:  Miladys Gunter DO (PGY-2)/Staff  I, Maribell Quiles MD, saw this patient with the resident and agree with the resident s findings and plan of care as documented in the  resident s note.

## 2020-10-06 NOTE — PROGRESS NOTES
[facility] Dermatology Note      Dermatology Problem List:  1. Atopic dermatitis   - HC 2.5% ointment  2. Seborrheic dermatitis, possible perioral dermatitis component  - ketoconazole shampoo, ketoconazole 2% cream, fluocinolone 0.01% oil  3. 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  States it's getting worse.       History of Present Illness:  Ms. Brittanie Tavares is a 50 year old female who presents as a follow-up for dermatitis of the face and scalp. The patient was last seen on 1/6/20 when she was prescribed hydrocortisone 2.5% ointment for her dermatitis. She has since done well with this ointment for the body, especially under the occlusion, however has noticed that her dermatitis of the face and scalp has persisted. She was subsequently prescribed fluocinolone solution, however this caused some stinging of the scalp. She reports increased pruritis and flaking of the occipital scalp, brow area, perinasal region and perioral region. These areas seem to worsen with stress.    Past Medical History:   Patient Active Problem List   Diagnosis     Menorrhagia     Fibroids, intramural     Past Medical History:   Diagnosis Date     Anesthesia complication     Slow to awaken     Fibroids      Infertility      Sleep apnea     not formally diagnosed--was noted to have decreased O2 sats when sleeping      Past Surgical History:   Procedure Laterality Date     C TOTAL HIP ARTHROPLASTY      left     C/SECTION, CLASSICAL  1998     DILATE CERVIX, ABLATE ENDOMETRIUM HYDROTHERMAL, COMBINED N/A 2/17/2015    Procedure: COMBINED DILATE CERVIX, ABLATE ENDOMETRIUM HYDROTHERMAL;  Surgeon: Viviana Varela MD;  Location: [facility] OR     HYSTERECTOMY TOTAL ABDOMINAL N/A 3/13/2015    Procedure: HYSTERECTOMY TOTAL ABDOMINAL;  Surgeon: Avery  Viviana Escobar MD;  Location: [facility] OR     hysteroscopic myomectomy  2010     SALPINGECTOMY Bilateral 3/13/2015    Procedure: SALPINGECTOMY;  Surgeon: Viviana Varela MD;  Location: [facility] OR       Social History:  Patient reports that she has quit smoking. She has never used smokeless tobacco. She reports current alcohol use of about 0.8 - 4.2 standard drinks of alcohol per week. She reports that she does not use drugs.    Family History:  Family History   Problem Relation Age of Onset     Breast Cancer Mother      Myocardial Infarction Maternal Grandmother      Diabetes No family hx of      Coronary Artery Disease No family hx of      Hypertension No family hx of      Melanoma No family hx of      Skin Cancer No family hx of        Medications:  Current Outpatient Medications   Medication Sig Dispense Refill     hydrocortisone 1 % CREA cream daily       hydrocortisone 2.5 % ointment Apply topically 2 times daily 30 g 6     betamethasone dipropionate (DIPROSONE) 0.05 % external lotion Apply topically 2 times daily (Patient not taking: Reported on 10/6/2020) 60 mL 3     fluocinolone (SYNALAR) 0.01 % solution Apply topically 2 times daily (Patient not taking: Reported on 10/6/2020) 60 mL 6     fluocinonide (LIDEX) 0.05 % external ointment At bedtime under occlusion to rash on hands, feet and arms (Patient not taking: Reported on 10/6/2020) 60 g 3        No Known Allergies    Review of Systems:  -Skin Establ Pt: The patient denies any new rash, pruritus, or lesions that are symptomatic, changing or bleeding, except as per HPI.  -Const: Denies fevers, chills or changes in weight.   -Constitutional: Otherwise feeling well today, in usual state of health.  -HEENT: Patient denies nonhealing oral sores.  -Skin: As above in HPI. No additional skin concerns.    Physical exam:  Vitals: LMP 03/13/2015   GEN: This is a well developed, well-nourished female in no acute distress, in a pleasant mood.    SKIN: Focused examination of the  face and scalp was performed.  -Caputo skin type: II  -There is macular erythema with few small pink papules of the brows, perinasal, perioral areas and occipital scalp with mild flaky white scale.  -No other lesions of concern on areas examined.     Impression/Plan:  1. Seborrheic dermatitis with possible perioral dermatitis component  Discussed the etiology of seborrheic dermatitis, including that it is not contagious, and also reviewed available treatment options including topical antifungals and corticosteroids. We also discussed that given the distribution, there is a possibility that she may have a component of perioral dermatitis, however she reports no history of pustules in these areas. We discussed that should she fail to improve with our new treatment plan, she may require a topical antibiotic as well.  - Start ketoconazole 2% shampoo 2-3 times per week  - Start fluocinolone 0.01% oil nightly under occlusion with shower cap for 2 weeks then as needed for acute flares  - Start ketoconazole 2% cream BID on the face  - Consider topical antibiotic if no improvement with this treatment plan    2. Atopic dermatitis, well controlled  - Continue HC 2.5% with or without occlusion BID as needed, refills provided  - Continue gentle skincare    CC Referred Self, MD  No address on file on close of this encounter.    Follow-up in 3 months, earlier for new or changing lesions.     Dr. Henderson staffed the patient.    Staff Involved:  Miladys Gunter DO (PGY-2)/Staff  I, Maribell Quiles MD, saw this patient with the resident and agree with the resident s findings and plan of care as documented in the resident s note.

## 2020-10-06 NOTE — PATIENT INSTRUCTIONS
Seborrheic Dermatitis    Seborrheic dermatitis is a skin condition that causes redness, scaly or flaky patches, and sometimes itching. It usually affects areas with many oil glands. These include the scalp, face, upper chest, and back. Dandruff is a mild type of seborrheic dermatitis. It is caused by a normal yeast that lives in our oil glands. This condition is not contiguous.      Wash hair with ketoconazole 2% shampoo 2-3 times a week. Leave medication on scalp for 5 minutes before rinsing off. Can alternate this with Head & Shoulders shampoo or any other over the counter anti-dandruff shampoos.     Massage fluocinolone oil to the scalp nightly and leave on overnight under a shower cap. Wash off in the morning.     Apply ketoconazole cream to the red and/or scaly areas of face twice daily.

## 2020-10-23 ENCOUNTER — THERAPY VISIT (OUTPATIENT)
Dept: PHYSICAL THERAPY | Facility: CLINIC | Age: 50
End: 2020-10-23
Payer: COMMERCIAL

## 2020-10-23 ENCOUNTER — TELEPHONE (OUTPATIENT)
Dept: ORTHOPEDICS | Facility: CLINIC | Age: 50
End: 2020-10-23

## 2020-10-23 DIAGNOSIS — G89.29 CHRONIC RIGHT SHOULDER PAIN: Primary | ICD-10-CM

## 2020-10-23 DIAGNOSIS — M25.511 CHRONIC RIGHT SHOULDER PAIN: Primary | ICD-10-CM

## 2020-10-23 DIAGNOSIS — S49.90XA SHOULDER INJURY: Primary | ICD-10-CM

## 2020-10-23 PROCEDURE — 97530 THERAPEUTIC ACTIVITIES: CPT | Mod: GP | Performed by: PHYSICAL THERAPIST

## 2020-10-23 NOTE — PROGRESS NOTES
S:  Pt reports to PT with increased pain and decreased function.  No real mechanism-potentially some garage cleaning.  Pt has stopped PT exercises and would like to follow up with MD-specialist.  Average pain level 6/10 lateral and post shoulder.  This pain awakens her at night at times.  Pain is mostly present with using the arm.  Significant pain and difficulty with reaching behind back.  O:  Right shoulder MMT  Pain and decreased strength abd>IR>ER  Min pain scaption and flex  Right shoulder AROM  Pain with supine flex and lacking 10 decreases  Significant pain with abd greater than 110 degrees  A:  Pt has not made progress with PT focused at mobility and strength, postural ed.  Pt will benefit from return to MD for potentially additional testing at this time.  P: hold PT

## 2020-10-23 NOTE — TELEPHONE ENCOUNTER
[facility] Call Center    Phone Message    May a detailed message be left on voicemail: yes     Reason for Call: Other: Patient was referred from her PT to see Dr. Rosenbaum today for her increased arm pain. Patient is wondering if she could have some imaging done prior to her appt. Patient was informed that an order would probably be required, and that i would put in the request. If patient cannot get imaigng prior to her appt, can somebody please call her to inform her, and help her reschedule or send her to scheduling to do so. thank you     Action Taken: Message routed to:  [facility] sports med    Travel Screening: Not Applicable

## 2020-10-23 NOTE — TELEPHONE ENCOUNTER
Returned call to patient, informing her that we could proceed with x-rays prior to her scheduled visit with Dr. Rosenbaum today. She states that she has already done x-rays and would like an MRI completed before she sees Dr. Rosenbaum today. She was informed that we would not be able to have an MRI done the same day as the appointment, due to scheduling the MRI and waiting for the radiologist to read the images. An order for the MRI was placed and she was given the phone number to Radiology to schedule. Her appointment for today was cancelled and she will call back to reschedule her visit, once she knows when her MRI is. She had no further questions.

## 2020-10-26 ENCOUNTER — TELEPHONE (OUTPATIENT)
Dept: ORTHOPEDICS | Facility: CLINIC | Age: 50
End: 2020-10-26

## 2020-10-26 ENCOUNTER — HOSPITAL ENCOUNTER (OUTPATIENT)
Dept: MRI IMAGING | Facility: CLINIC | Age: 50
Discharge: HOME | End: 2020-10-26
Attending: FAMILY MEDICINE | Admitting: FAMILY MEDICINE
Payer: COMMERCIAL

## 2020-10-26 DIAGNOSIS — G89.29 CHRONIC RIGHT SHOULDER PAIN: ICD-10-CM

## 2020-10-26 DIAGNOSIS — M25.511 CHRONIC RIGHT SHOULDER PAIN: ICD-10-CM

## 2020-10-26 PROCEDURE — 73221 MRI JOINT UPR EXTREM W/O DYE: CPT | Mod: RT

## 2020-10-26 PROCEDURE — 73221 MRI JOINT UPR EXTREM W/O DYE: CPT | Mod: 26 | Performed by: RADIOLOGY

## 2020-10-26 NOTE — TELEPHONE ENCOUNTER
[facility] Call Center    Phone Message    May a detailed message be left on voicemail: yes     Reason for Call: Other: first appt available with scheduling is 11/11/20 patient concerned about her shoulder pain and would like to see if she can be seen sooner or called with results. Please call her back at [phone]      Action Taken: Message routed to:  [facility] ortho    Travel Screening: Not Applicable

## 2020-10-27 ENCOUNTER — MYC MEDICAL ADVICE (OUTPATIENT)
Dept: ORTHOPEDICS | Facility: CLINIC | Age: 50
End: 2020-10-27

## 2020-10-27 DIAGNOSIS — G89.29 CHRONIC RIGHT SHOULDER PAIN: Primary | ICD-10-CM

## 2020-10-27 DIAGNOSIS — M25.511 CHRONIC RIGHT SHOULDER PAIN: Primary | ICD-10-CM

## 2020-10-27 NOTE — TELEPHONE ENCOUNTER
I attempted to call with results.  Mailbox is full.  I think we have an appt for her tomorrow.    Melissa

## 2020-10-27 NOTE — TELEPHONE ENCOUNTER
We can do a telephone call.  She has access to her results.  Yes, I'm sending her to shoulder surgery.  Melissa

## 2020-10-27 NOTE — TELEPHONE ENCOUNTER
----- Message from Seth Weeks, PT sent at 10/23/2020 10:57 AM CDT -----  Regarding: pt follow up  Brittanie Ac is not getting better.  Shoulder sxs are really problematic.  Possible MRI?  She is going to be contacting your group for follow up.  Seth    S:  Pt reports to PT with increased pain and decreased function.  No real mechanism-potentially some garage cleaning.  Pt has stopped PT exercises and would like to follow up with MD-specialist.  Average pain level 6/10 lateral and post shoulder.  This pain awakens her at night at times.  Pain is mostly present with using the arm.  Significant pain and difficulty with reaching behind back.  O:  Right shoulder MMT  Pain and decreased strength abd>IR>ER  Min pain scaption and flex  Right shoulder AROM  Pain with supine flex and lacking 10 decreases  Significant pain with abd greater than 110 degrees  A:  Pt has not made progress with PT focused at mobility and strength, postural ed.  Pt will benefit from return to MD for potentially additional testing at this time.  P: hold PT

## 2020-10-28 ENCOUNTER — VIRTUAL VISIT (OUTPATIENT)
Dept: ORTHOPEDICS | Facility: CLINIC | Age: 50
End: 2020-10-28
Payer: COMMERCIAL

## 2020-10-28 DIAGNOSIS — M25.511 CHRONIC RIGHT SHOULDER PAIN: Primary | ICD-10-CM

## 2020-10-28 DIAGNOSIS — G89.29 CHRONIC RIGHT SHOULDER PAIN: Primary | ICD-10-CM

## 2020-10-28 PROCEDURE — 99213 OFFICE O/P EST LOW 20 MIN: CPT | Mod: 95 | Performed by: FAMILY MEDICINE

## 2020-10-28 RX ORDER — TRAMADOL HYDROCHLORIDE 50 MG/1
50 TABLET ORAL EVERY 6 HOURS PRN
Qty: 15 TABLET | Refills: 0 | Status: SHIPPED | OUTPATIENT
Start: 2020-10-28 | End: 2020-10-31

## 2020-10-28 NOTE — LETTER
"  10/28/2020      RE: Brittanie Tavares  [address] [address]       Brittanie Tavares is a 50 year old female who is being evaluated via a billable video visit.      The patient has been notified of following:     \"This video visit will be conducted via a call between you and your physician/provider. We have found that certain health care needs can be provided without the need for an in-person physical exam.  This service lets us provide the care you need with a video conversation.  If a prescription is necessary we can send it directly to your pharmacy.  If lab work is needed we can place an order for that and you can then stop by our lab to have the test done at a later time.    Video visits are billed at different rates depending on your insurance coverage.  Please reach out to your insurance provider with any questions.    If during the course of the call the physician/provider feels a video visit is not appropriate, you will not be charged for this service.\"    Patient has given verbal consent for Video visit? Yes  How would you like to obtain your AVS? MyChart  If you are dropped from the video visit, the video invite should be resent to: Text to cell phone: [phone]  Will anyone else be joining your video visit? No      Video-Visit Details  Pt is a 51 yo female with PMHx of right hip pain and she is currently struggling with increased right shoulder pain.  Patient did have an MRI after weeks of PT with Seth GEORGE without improvement.  Findings on MRI are consistent with a supraspinatus tear and infraspinatus involvement.  Patient is having difficulty sleeping.    Patient is a 50-year-old female with worsening right shoulder pain especially at night  1.  Rotator cuff tears -supraspinatus and infraspinatus-  Patient willing to see shoulder surgery in consultation  Tramadol 50 mg at night    RTC for consideration of CSI if not having surgical procedure    Type of service:  Video " Visit    Video Start Time: 11:37 AM  Video End Time: 11:50 AM    Originating Location (pt. Location): Home    Distant Location (provider location):  [facility] SP[facility]TS MEDIC[facility]     Platform used for Video Visit: Jocelin Rosenbaum MD

## 2020-10-28 NOTE — PROGRESS NOTES
"Brittanie Tavares is a 50 year old female who is being evaluated via a billable video visit.      The patient has been notified of following:     \"This video visit will be conducted via a call between you and your physician/provider. We have found that certain health care needs can be provided without the need for an in-person physical exam.  This service lets us provide the care you need with a video conversation.  If a prescription is necessary we can send it directly to your pharmacy.  If lab work is needed we can place an order for that and you can then stop by our lab to have the test done at a later time.    Video visits are billed at different rates depending on your insurance coverage.  Please reach out to your insurance provider with any questions.    If during the course of the call the physician/provider feels a video visit is not appropriate, you will not be charged for this service.\"    Patient has given verbal consent for Video visit? Yes  How would you like to obtain your AVS? MyChart  If you are dropped from the video visit, the video invite should be resent to: Text to cell phone: [phone]  Will anyone else be joining your video visit? No      Video-Visit Details  Pt is a 51 yo female with PMHx of right hip pain and she is currently struggling with increased right shoulder pain.  Patient did have an MRI after weeks of PT with Seth GEORGE without improvement.  Findings on MRI are consistent with a supraspinatus tear and infraspinatus involvement.  Patient is having difficulty sleeping.    Patient is a 50-year-old female with worsening right shoulder pain especially at night  1.  Rotator cuff tears -supraspinatus and infraspinatus-  Patient willing to see shoulder surgery in consultation  Tramadol 50 mg at night    RTC for consideration of CSI if not having surgical procedure    Type of service:  Video Visit    Video Start Time: 11:37 AM  Video End Time: 11:50 AM    Originating Location (pt. Location): " Home    Distant Location (provider location):  [facility]IC[facility]     Platform used for Video Visit: Jocelin Rosenbaum MD

## 2020-10-28 NOTE — LETTER
"10/28/2020       RE: Brittanie Tavares  [address] [address]     Dear Colleague,    Thank you for referring your patient, Brittanie Tavares, to the [facility] CLINIC [place] [facility]. Please see a copy of my visit note below.    Brittanie Tavares is a 50 year old female who is being evaluated via a billable video visit.      The patient has been notified of following:     \"This video visit will be conducted via a call between you and your physician/provider. We have found that certain health care needs can be provided without the need for an in-person physical exam.  This service lets us provide the care you need with a video conversation.  If a prescription is necessary we can send it directly to your pharmacy.  If lab work is needed we can place an order for that and you can then stop by our lab to have the test done at a later time.    Video visits are billed at different rates depending on your insurance coverage.  Please reach out to your insurance provider with any questions.    If during the course of the call the physician/provider feels a video visit is not appropriate, you will not be charged for this service.\"    Patient has given verbal consent for Video visit? Yes  How would you like to obtain your AVS? MyChart  If you are dropped from the video visit, the video invite should be resent to: Text to cell phone: [phone]  Will anyone else be joining your video visit? No      Video-Visit Details  Pt is a 49 yo female with PMHx of right hip pain and she is currently struggling with increased right shoulder pain.  Patient did have an MRI after weeks of PT with Seth GEORGE without improvement.  Findings on MRI are consistent with a supraspinatus tear and infraspinatus involvement.  Patient is having difficulty sleeping.    Patient is a 50-year-old female with worsening right shoulder pain especially at night  1.  Rotator " cuff tears -supraspinatus and infraspinatus-  Patient willing to see shoulder surgery in consultation  Tramadol 50 mg at night    RTC for consideration of CSI if not having surgical procedure    Type of service:  Video Visit    Video Start Time: 11:37 AM  Video End Time: 11:50 AM    Originating Location (pt. Location): Home    Distant Location (provider location):  [facility]     Platform used for Video Visit: Jocelin Rosenbaum MD

## 2020-11-04 NOTE — TELEPHONE ENCOUNTER
RECORDS RECEIVED FROM: Torn rotator cuff (R) shoulder/ [facility]   DATE RECEIVED: Dec 4, 2020     NOTES STATUS DETAILS   OFFICE NOTE from referring provider Internal    OFFICE NOTE from other specialist N/A    DISCHARGE SUMMARY from hospital N/A    DISCHARGE REPORT from the ER N/A    OPERATIVE REPORT N/A    MEDICATION LIST Internal    IMPLANT RECORD/STICKER N/A    LABS     CBC/DIFF N/A    CULTURES N/A    INJECTIONS DONE IN RADIOLOGY N/A    MRI Internal    CT SCAN N/A    XRAYS (IMAGES & REPORTS) Internal    TUMOR     PATHOLOGY  Slides & report N/A      11/04/20   11:52 AM   Complete  Tonya Ramirez CMA

## 2020-12-04 ENCOUNTER — PRE VISIT (OUTPATIENT)
Dept: ORTHOPEDICS | Facility: CLINIC | Age: 50
End: 2020-12-04

## 2021-01-15 ENCOUNTER — HEALTH MAINTENANCE LETTER (OUTPATIENT)
Age: 51
End: 2021-01-15

## 2021-05-15 ENCOUNTER — HEALTH MAINTENANCE LETTER (OUTPATIENT)
Age: 51
End: 2021-05-15

## 2021-06-01 ENCOUNTER — OFFICE VISIT (OUTPATIENT)
Dept: DERMATOLOGY | Facility: CLINIC | Age: 51
End: 2021-06-01
Payer: COMMERCIAL

## 2021-06-01 DIAGNOSIS — K13.0 LIP FISSURE: ICD-10-CM

## 2021-06-01 DIAGNOSIS — L21.9 DERMATITIS, SEBORRHEIC: Primary | ICD-10-CM

## 2021-06-01 PROCEDURE — 99214 OFFICE O/P EST MOD 30 MIN: CPT | Mod: GC | Performed by: DERMATOLOGY

## 2021-06-01 RX ORDER — TACROLIMUS 1 MG/G
OINTMENT TOPICAL 2 TIMES DAILY
Qty: 60 G | Refills: 2 | Status: SHIPPED | OUTPATIENT
Start: 2021-06-01 | End: 2022-03-17

## 2021-06-01 RX ORDER — CLINDAMYCIN PHOSPHATE 10 UG/ML
LOTION TOPICAL 2 TIMES DAILY
Qty: 60 ML | Refills: 2 | Status: SHIPPED | OUTPATIENT
Start: 2021-06-01 | End: 2021-10-07

## 2021-06-01 ASSESSMENT — PAIN SCALES - GENERAL: PAINLEVEL: NO PAIN (0)

## 2021-06-01 NOTE — PROGRESS NOTES
[facility] Dermatology Note  Encounter Date: Jun 1, 2021  Office Visit     Dermatology Problem List:  1. Atopic dermatitis   - HC 2.5% ointment  2. Seborrheic dermatitis, possible perioral dermatitis component  - using pimecrolimus cream for face (obtained from outside Derm), augmented betamethasone diproprionate 0.05% gel for scalp  - says ketoconazole shampoo/cream was not effective  3. 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 No active areas today for a culture as patient was hoping for.   - continue calcineurin inhibitor, but change to tacrolimus ointment and use once to twice weekly  - continue augmented betamethasone diproprionate gel twice weekly  - if patient has flaring of dermatitis, she should call to make appointment with Dr. Tierney for biopsy only    # Recurrent lesion of left upper lip at vermillion border.   Only very subtle papule on exam today. Concern would be for HSV infection vs secondary infection of eczema/fissure. Recommend that patient present to PCP or urgent care next time this flares for a HSV PCR swab of lesion. Also for patient to take picture if flaring to send into Derm.     Follow-up: 1 year(s) in-person, or earlier for new or changing lesions    Staff and Resident:     Dr. Tierney staffed the patient.     Joaquin Geller MD, PhD  Med-Derm PGY-5    I have seen and examined this patient and agree with the assessment and plan as documented in the resident's note.    Gianfranco Tierney MD  Dermatology Attending    ____________________________________________    CC: Derm Problem (Here to follow up on dermatitis )    HPI:  Ms. Brittanie Tavares is a(n) 51 year old female who presents today as a return patient for evaluation of red scaly  plaques on the posterior scalp and also over her eyebrows, nasal sulci and superior and inferior cutaneous lips. She last saw Dr. Quiles in Oct 2020. Since that time she also saw an outside Dermatologist and is currently using pimecrolimus cream for her face and augmented betamethasone 0.05% cream for the scalp. She is only using these a couple times a week. Doesn't like pimecrolimus because it causes her face to sting.  She is also concerned today about a spot on her left upper lip at the vermillion border that comes and goes. She says it gets tingly and is painful. She applies bacitracin to it and it seems to go away. She denies a history of cold sores, but says she has family members who have cold sores.     Patient is otherwise feeling well, without additional skin concerns.    Labs Reviewed:  N/A    Physical Exam:  Vitals: LMP 03/13/2015   SKIN: Focused examination of scalp, face, hands was performed.  - there is a pink plaque with minimal scale on the occipital scalp  - there is a subtle light pink plaque above the left eyebrow  - there is a subtle scaly papule at the left upper lip on the vermillion border  - No other lesions of concern on areas examined.     Medications:  Current Outpatient Medications   Medication     betamethasone dipropionate (DIPROSONE) 0.05 % external lotion     fluocinolone (SYNALAR) 0.01 % solution     fluocinolone acetonide (DERMA SMOOTHE/FS BODY) 0.01 % external oil     fluocinonide (LIDEX) 0.05 % external ointment     hydrocortisone 1 % CREA cream     hydrocortisone 2.5 % ointment     ketoconazole (NIZORAL) 2 % external cream     ketoconazole (NIZORAL) 2 % external shampoo     metroNIDAZOLE (METROCREAM) 0.75 % external cream     No current facility-administered medications for this visit.       Past Medical History:   Patient Active Problem List   Diagnosis     Menorrhagia     Fibroids, intramural     Past Medical History:   Diagnosis Date     Anesthesia complication     Slow to  awaken     Fibroids      Infertility      Sleep apnea     not formally diagnosed--was noted to have decreased O2 sats when sleeping        CC Referred Self, MD  No address on file on close of this encounter.

## 2021-06-01 NOTE — PATIENT INSTRUCTIONS
- continue using betamethasone diproprionate 0.05% gel for scalp  - continue using pimecrolimus cream for face  - may start using clindamycin 1% lotion for

## 2021-06-01 NOTE — NURSING NOTE
Dermatology Rooming Note    Brittanie Tavares's goals for this visit include:   Chief Complaint   Patient presents with     Derm Problem     Here to follow up on dermatitis    Rhianna Devries RN

## 2021-06-01 NOTE — LETTER
6/1/2021       RE: Brittanie Tavares  [address] [facility] [id number]     Dear Colleague,    Thank you for referring your patient, Brittanie Tavares, to the [facility] DERMATOLOGY [facility] [place] at [facility]. Please see a copy of my visit note below.    [facility] Dermatology Note  Encounter Date: Jun 1, 2021  Office Visit     Dermatology Problem List:  1. Atopic dermatitis   - HC 2.5% ointment  2. Seborrheic dermatitis, possible perioral dermatitis component  - using pimecrolimus cream for face (obtained from outside Derm), augmented betamethasone diproprionate 0.05% gel for scalp  - says ketoconazole shampoo/cream was not effective  3. 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 No active areas today for a culture as patient was hoping for.   - continue calcineurin inhibitor, but change to tacrolimus ointment and use once to twice weekly  - continue augmented betamethasone diproprionate gel twice weekly  - if patient has flaring of dermatitis, she should call to make appointment with Dr. Tierney for biopsy only    # Recurrent lesion of left upper lip at vermillion border.   Only very subtle papule on exam today. Concern would be for HSV infection vs secondary infection of eczema/fissure. Recommend that patient present to PCP or urgent care next time this flares for a HSV PCR swab of lesion. Also for patient to take picture if flaring to send into Derm.     Follow-up: 1 year(s) in-person, or earlier for new or changing lesions    Staff and Resident:     Dr. Tierney staffed the patient.     Joaquin Geller MD, PhD  Med-Derm PGY-5    I have seen and examined this patient and agree with the assessment  and plan as documented in the resident's note.    Gianfranco Tierney MD  Dermatology Attending    ____________________________________________    CC: Derm Problem (Here to follow up on dermatitis )    HPI:  Ms. Brittanie Tavares is a(n) 51 year old female who presents today as a return patient for evaluation of red scaly plaques on the posterior scalp and also over her eyebrows, nasal sulci and superior and inferior cutaneous lips. She last saw Dr. Quiles in Oct 2020. Since that time she also saw an outside Dermatologist and is currently using pimecrolimus cream for her face and augmented betamethasone 0.05% cream for the scalp. She is only using these a couple times a week. Doesn't like pimecrolimus because it causes her face to sting.  She is also concerned today about a spot on her left upper lip at the vermillion border that comes and goes. She says it gets tingly and is painful. She applies bacitracin to it and it seems to go away. She denies a history of cold sores, but says she has family members who have cold sores.     Patient is otherwise feeling well, without additional skin concerns.    Labs Reviewed:  N/A    Physical Exam:  Vitals: LMP 03/13/2015   SKIN: Focused examination of scalp, face, hands was performed.  - there is a pink plaque with minimal scale on the occipital scalp  - there is a subtle light pink plaque above the left eyebrow  - there is a subtle scaly papule at the left upper lip on the vermillion border  - No other lesions of concern on areas examined.     Medications:  Current Outpatient Medications   Medication     betamethasone dipropionate (DIPROSONE) 0.05 % external lotion     fluocinolone (SYNALAR) 0.01 % solution     fluocinolone acetonide (DERMA SMOOTHE/FS BODY) 0.01 % external oil     fluocinonide (LIDEX) 0.05 % external ointment     hydrocortisone 1 % CREA cream     hydrocortisone 2.5 % ointment     ketoconazole (NIZORAL) 2 % external cream     ketoconazole (NIZORAL) 2 % external  shampoo     metroNIDAZOLE (METROCREAM) 0.75 % external cream     No current facility-administered medications for this visit.       Past Medical History:   Patient Active Problem List   Diagnosis     Menorrhagia     Fibroids, intramural     Past Medical History:   Diagnosis Date     Anesthesia complication     Slow to awaken     Fibroids      Infertility      Sleep apnea     not formally diagnosed--was noted to have decreased O2 sats when sleeping        CC Referred Self, MD  No address on file on close of this encounter.

## 2021-06-17 PROBLEM — K13.0 LIP FISSURE: Status: ACTIVE | Noted: 2021-06-17

## 2021-06-17 PROBLEM — L21.9 DERMATITIS, SEBORRHEIC: Status: ACTIVE | Noted: 2021-06-17

## 2021-09-04 ENCOUNTER — HEALTH MAINTENANCE LETTER (OUTPATIENT)
Age: 51
End: 2021-09-04

## 2021-09-15 ENCOUNTER — ANCILLARY PROCEDURE (OUTPATIENT)
Dept: MAMMOGRAPHY | Facility: CLINIC | Age: 51
End: 2021-09-15
Attending: FAMILY MEDICINE
Payer: COMMERCIAL

## 2021-09-15 ENCOUNTER — TELEPHONE (OUTPATIENT)
Dept: OBGYN | Facility: CLINIC | Age: 51
End: 2021-09-15

## 2021-09-15 DIAGNOSIS — Z12.31 VISIT FOR SCREENING MAMMOGRAM: ICD-10-CM

## 2021-09-15 PROCEDURE — 77067 SCR MAMMO BI INCL CAD: CPT | Mod: TC | Performed by: RADIOLOGY

## 2021-09-15 PROCEDURE — 77063 BREAST TOMOSYNTHESIS BI: CPT | Mod: TC | Performed by: RADIOLOGY

## 2021-09-15 NOTE — TELEPHONE ENCOUNTER
ANALI and salpingectomy in 2015.  Wants to know if she is still supposed to have exams.  Advised breast exam and pelvic to assess ovaries.  If no concerns, could be followed by PCP

## 2021-09-15 NOTE — TELEPHONE ENCOUNTER
----- Message from Kathryn Soria RN sent at 9/15/2021  1:02 PM CDT -----  Regarding: had surgery with Dr laird  Supposed to have exams?  Not sure of the follow up.  Pt cant see visits in [facility]

## 2021-10-06 NOTE — PROGRESS NOTES
Progress Note    SUBJECTIVE:  Brittanie Tavares is an 51 year old, , who requests a breast and pelvic exam.  Patient is followed by Dr. Haley for primary care.  Brittanie had a ANALI with bilateral salpingectomy in  for heavy menstrual bleeding and fibroids.     Concerns today include:     1. Low libido and difficulty with arousal: No intercourse for about 1 yr after hysterectomy due to recovery time; when she did become sexually active, she reports low libido and difficulty with arousal. Had pain at first, but denies dyspareunia now. Reports good relationship, same partner since surgery (). Denies mental health concerns. Expresses low interest in sexual activity and difficulty becoming aroused which are bothersome to patient. Feels that she went through menopause in the days after her surgery. Has questions about whether her ovaries remain in place or if they were removed during her ANALI with salpingectomy surgery.     2. Nocturia & incontinence, only at night: Reports having to get up to urinate 3-4 times per night. When she doesn't get up that frequently, bladder is often full when she does get up and will occasionally have incontinence.  Does not have incontinence any other time of the day. Does not drink caffeine. Drinks ~16 oz of water after dinner and several glasses throughout the day. Denies dysuria, fever, flank pain.  No daytime symptoms.       Menstrual History:  Menstrual History 2015 2015 3/13/2015   LAST MENSTRUAL PERIOD 2015 2015 3/13/2015       Last pap smear: prior to hysterectomy   History of abnormal Pap smear: no hx of ODELL 2 or greater; Status post benign hysterectomy. Health Maintenance and Surgical History updated.    Mammogram: 9/15/2021 normal result    HISTORY:  Prescription Medications as of 10/15/2021       Rx Number Disp Refills Start End Last Dispensed Date Next Fill Date Owning Pharmacy    desoximetasone (TOPICORT) 0.25 % external cream            Sig:  Apply 1 Application topically    Class: Historical    Route: Topical    ketoconazole (NIZORAL) 2 % external shampoo  120 mL 5 10/6/2020    [id number] [place], [place] - [address]    Sig: Massage into scalp, let sit 3-5 min, then rinse. Use 2-3 times per week    Class: E-Prescribe    tacrolimus (PROTOPIC) 0.1 % external ointment  60 g 2 2021    [id number] [address]    Sig: Apply topically 2 times daily    Class: E-Prescribe    Route: Topical    Prior authorization: Closed - Prior Authorization not required for patient/medication        No Known Allergies  Immunization History   Administered Date(s) Administered     COVID-19,PF,Pfizer 2021, 2021       OB History    Para Term  AB Living   1 1 1 0 0 1   SAB TAB Ectopic Multiple Live Births   0 0 0 0 0     Past Medical History:   Diagnosis Date     Anemia     Eventually resolved following hysterectomy     Anesthesia complication     Slow to awaken     Fibroid     Resolved by hysterectomy     Fibroids      History of blood transfusion     Needed due to fibroids     Infertility      Sleep apnea     not formally diagnosed--was noted to have decreased O2 sats when sleeping      Varicella     Had when I was a child     Past Surgical History:   Procedure Laterality Date     ABDOMEN SURGERY  1998    C section     C TOTAL HIP ARTHROPLASTY      left     C/SECTION, CLASSICAL       DILATE CERVIX, ABLATE ENDOMETRIUM HYDROTHERMAL, COMBINED N/A 2015    Procedure: COMBINED DILATE CERVIX, ABLATE ENDOMETRIUM HYDROTHERMAL;  Surgeon: Viviana Varela MD;  Location: [facility] OR     HYSTERECTOMY TOTAL ABDOMINAL N/A 3/13/2015    Procedure: HYSTERECTOMY TOTAL ABDOMINAL;  Surgeon: Viviana Varela MD;  Location: [facility] OR     hysteroscopic myomectomy  2010     SALPINGECTOMY Bilateral 3/13/2015    Procedure: SALPINGECTOMY;  Surgeon: Viviana Varela MD;  Location:  [facility] OR     Family History   Problem Relation Age of Onset     Breast Cancer Mother      Substance Abuse Father      Depression Father      Diabetes Father      Myocardial Infarction Maternal Grandmother      Coronary Artery Disease Maternal Grandmother      Hypertension Maternal Grandmother      Anxiety Disorder Brother      Mental Illness Brother      Substance Abuse Brother      Depression Brother      Diabetes No family hx of      Coronary Artery Disease No family hx of      Hypertension No family hx of      Melanoma No family hx of      Skin Cancer No family hx of      Social History     Socioeconomic History     Marital status:      Spouse name: None     Number of children: None     Years of education: None     Highest education level: None   Occupational History     Occupation: tech support     Employer: [employer]   Tobacco Use     Smoking status: Former Smoker     Packs/day: 0.00     Years: 19.00     Pack years: 0.00     Start date: 3/31/1986     Quit date: 3/12/2005     Years since quittin.5     Smokeless tobacco: Never Used     Tobacco comment: quit    Substance and Sexual Activity     Alcohol use: Yes     Alcohol/week: 0.8 - 4.2 standard drinks     Comment: couple drinks per day on average     Drug use: No     Sexual activity: Yes     Partners: Male     Birth control/protection: Post-menopausal, Female Surgical   Other Topics Concern     Parent/sibling w/ CABG, MI or angioplasty before 65F 55M? Not Asked   Social History Narrative    1/14/15    , one child    Works at a desk.    Viviana Varela MD     Social Determinants of Health     Financial Resource Strain:      Difficulty of Paying Living Expenses:    Food Insecurity:      Worried About Running Out of Food in the Last Year:      Ran Out of Food in the Last Year:    Transportation Needs:      Lack of Transportation (Medical):      Lack of Transportation (Non-Medical):    Physical Activity:      Days of Exercise per Week:       Minutes of Exercise per Session:    Stress:      Feeling of Stress :    Social Connections:      Frequency of Communication with Friends and Family:      Frequency of Social Gatherings with Friends and Family:      Attends L[employer] day Services:      Active Member of Clubs or Organizations:      Attends Club or Organization Meetings:      Marital Status:    Intimate Partner Violence:      Fear of Current or Ex-Partner:      Emotionally Abused:      Physically Abused:      Sexually Abused:        Review of Systems     Constitutional:  Negative for fever, chills, weight loss, weight gain, fatigue, decreased appetite, night sweats, recent stressors, height gain, height loss, post-operative complications, incisional pain, hallucinations, increased energy, hyperactivity and confused.   HENT:  Negative for ear pain, hearing loss, tinnitus, nosebleeds, trouble swallowing, hoarse voice, mouth sores, sore throat, ear discharge, tooth pain, gum tenderness, taste disturbance, smell disturbance, hearing aid, bleeding gums, dry mouth, sinus pain, sinus congestion and neck mass.    Eyes:  Negative for double vision, pain, redness, eye pain, decreased vision, eye watering, eye bulging, eye dryness, flashing lights, spots, floaters, strabismus, tunnel vision, jaundice and eye irritation.   Respiratory:   Negative for cough, hemoptysis, sputum production, shortness of breath, wheezing, sleep disturbances due to breathing, snores loudly, respiratory pain, dyspnea on exertion, cough disturbing sleep and postural dyspnea.    Cardiovascular:  Negative for chest pain, dyspnea on exertion, palpitations, orthopnea, claudication, leg swelling, fingers/toes turn blue, hypertension, hypotension, syncope, history of heart murmur, chest pain on exertion, chest pain at rest, pacemaker, few scattered varicosities, leg pain, sleep disturbances due to breathing, tachycardia, light-headedness, exercise intolerance and edema.   Gastrointestinal:   "Positive for heartburn, diarrhea, constipation and change in stool. Negative for nausea, vomiting, abdominal pain, blood in stool, melena, rectal pain, bloating, bowel incontinence, jaundice and coffee ground emesis.   Genitourinary:  Positive for bladder incontinence, decreased libido, nocturia and arousal difficulty. Negative for dysuria, urgency, hematuria, flank pain, vaginal discharge, difficulty urinating, genital sores, dyspareunia, voiding less frequently, abnormal vaginal bleeding, excessive menstruation, menstrual changes, hot flashes, vaginal dryness and postmenopausal bleeding.   Musculoskeletal:  Negative for myalgias, back pain, joint swelling, arthralgias, stiffness, muscle cramps, neck pain, bone pain, muscle weakness and fracture.   Skin:  Negative for nail changes, itching, poor wound healing, rash, hair changes, skin changes, acne, warts, poor wound healing, scarring, flaky skin, Raynaud's phenomenon, sensitivity to sunlight and skin thickening.   Neurological:  Negative for dizziness, tingling, tremors, speech change, seizures, loss of consciousness, weakness, light-headedness, numbness, headaches, disturbances in coordination, extremity numbness, memory loss, difficulty walking and paralysis.   Endo/Heme:  Negative for anemia, swollen glands and bruises/bleeds easily.   Psychiatric/Behavioral:  Negative for depression, hallucinations, memory loss, decreased concentration, mood swings and panic attacks.    Breast:  Negative for breast discharge, breast mass, breast pain and nipple retraction.   Endocrine:  Negative for altered temperature regulation, polyphagia, polydipsia, unwanted hair growth and change in facial hair.    Answers for HPI/ROS submitted by the patient on 10/7/2021  MINA 7 TOTAL SCORE: 0    EXAM:  Blood pressure 112/77, pulse 63, height 1.651 m (5' 5\"), weight 94.7 kg (208 lb 12.8 oz), last menstrual period 03/13/2015, not currently breastfeeding. Body mass index is 34.75 " kg/m .  General appearance: Pleasant female in no acute distress.     BREAST EXAM:  Breast: Without visible skin changes. No dimpling or lesions seen.   Breasts supple, non-tender with palpation, no dominant mass, nodularity, or nipple discharge noted bilaterally. Axillary nodes negative.      PELVIC EXAM:  EG/BUS: Normal genital architecture without lesions, erythema or abnormal secretions; Bartholin's, Urethra, [place]'s normal; pelvic floor weakness with Kegal   Urethral meatus: normal   Urethra: no masses, tenderness, or scarring   Bladder: no masses or tenderness   Cervix: surgically absent; vaginal cuff intact  Uterus: surgically absent  Adnexa: Not palpable    ASSESSMENT:  Encounter Diagnoses   Name Primary?     Screen for STD (sexually transmitted disease)      Low libido      Urgency incontinence      Encounter for gynecological examination (general) (routine) without abnormal findings Yes        PLAN:   Orders Placed This Encounter   Procedures     [facility] Referral     Physical Therapy Referral     1. Low libido: Referred pt to [facility]; counseled on many factors that may play a role in libido and arousal.   2. Nocturia/ Incontinence at night: Recommended limiting intake of fluids after dinner; recommended Kegal exercises and pelvic floor PT for management of incontinence. Offered referral to urology, pt declined at this time.  3. Preventive health: Counseled pt that her ovaries remain in place and clarified op note and pathology findings with pt.  Counseled on recommendations re: gyn preventive health. Recommend yearly clinical breast exam and mammograms (currently up to date); limited evidence for routine pelvic exams; recommend pelvic exams if symptoms present. No need for further pap smears. STD testing completed today.      Return in one year/PRN for preventive care or problems/concerns.     Verbalized understanding and agreement with visit plan.  Rachel Trammell,  DNP, APRN, WHNP

## 2021-10-07 ENCOUNTER — OFFICE VISIT (OUTPATIENT)
Dept: OBGYN | Facility: CLINIC | Age: 51
End: 2021-10-07
Attending: NURSE PRACTITIONER
Payer: COMMERCIAL

## 2021-10-07 VITALS
SYSTOLIC BLOOD PRESSURE: 112 MMHG | DIASTOLIC BLOOD PRESSURE: 77 MMHG | HEIGHT: 65 IN | WEIGHT: 208.8 LBS | HEART RATE: 63 BPM | BODY MASS INDEX: 34.79 KG/M2

## 2021-10-07 DIAGNOSIS — R68.82 LOW LIBIDO: ICD-10-CM

## 2021-10-07 DIAGNOSIS — Z11.3 SCREEN FOR STD (SEXUALLY TRANSMITTED DISEASE): ICD-10-CM

## 2021-10-07 DIAGNOSIS — Z01.419 ENCOUNTER FOR GYNECOLOGICAL EXAMINATION WITHOUT ABNORMAL FINDING: ICD-10-CM

## 2021-10-07 DIAGNOSIS — N39.41 URGENCY INCONTINENCE: ICD-10-CM

## 2021-10-07 DIAGNOSIS — Z01.419 ENCOUNTER FOR GYNECOLOGICAL EXAMINATION (GENERAL) (ROUTINE) WITHOUT ABNORMAL FINDINGS: Primary | ICD-10-CM

## 2021-10-07 PROCEDURE — G0463 HOSPITAL OUTPT CLINIC VISIT: HCPCS

## 2021-10-07 PROCEDURE — 87491 CHLMYD TRACH DNA AMP PROBE: CPT | Performed by: NURSE PRACTITIONER

## 2021-10-07 PROCEDURE — 87591 N.GONORRHOEAE DNA AMP PROB: CPT | Performed by: NURSE PRACTITIONER

## 2021-10-07 PROCEDURE — G0101 CA SCREEN;PELVIC/BREAST EXAM: HCPCS | Performed by: NURSE PRACTITIONER

## 2021-10-07 RX ORDER — DESOXIMETASONE 2.5 MG/G
1 CREAM TOPICAL
COMMUNITY

## 2021-10-07 ASSESSMENT — ENCOUNTER SYMPTOMS
ORTHOPNEA: 0
COUGH DISTURBING SLEEP: 0
SINUS PAIN: 0
HEARTBURN: 1
TREMORS: 0
EYE REDNESS: 0
SHORTNESS OF BREATH: 0
LEG SWELLING: 0
CONSTIPATION: 1
SORE THROAT: 0
POSTURAL DYSPNEA: 0
HEMOPTYSIS: 0
TINGLING: 0
DIZZINESS: 0
HALLUCINATIONS: 0
SINUS CONGESTION: 0
FEVER: 0
ALTERED TEMPERATURE REGULATION: 0
PANIC: 0
DYSPNEA ON EXERTION: 0
HYPOTENSION: 0
SPEECH CHANGE: 0
LOSS OF CONSCIOUSNESS: 0
JAUNDICE: 0
DECREASED APPETITE: 0
SPUTUM PRODUCTION: 0
PARALYSIS: 0
EYE WATERING: 0
EXERCISE INTOLERANCE: 0
TACHYCARDIA: 0
INCREASED ENERGY: 0
BLOATING: 0
EYE PAIN: 0
SWOLLEN GLANDS: 0
FLANK PAIN: 0
SEIZURES: 0
SLEEP DISTURBANCES DUE TO BREATHING: 0
WHEEZING: 0
RECTAL PAIN: 0
NECK PAIN: 0
WEIGHT GAIN: 0
WEAKNESS: 0
EYE IRRITATION: 0
LIGHT-HEADEDNESS: 0
HOARSE VOICE: 0
BLOOD IN STOOL: 0
ARTHRALGIAS: 0
MUSCLE CRAMPS: 0
NIGHT SWEATS: 0
DIARRHEA: 1
PALPITATIONS: 0
SMELL DISTURBANCE: 0
HEMATURIA: 0
DECREASED CONCENTRATION: 0
POOR WOUND HEALING: 0
INSOMNIA: 0
SKIN CHANGES: 0
EXTREMITY NUMBNESS: 0
DISTURBANCES IN COORDINATION: 0
SYNCOPE: 0
BREAST PAIN: 0
MUSCLE WEAKNESS: 0
CHILLS: 0
LEG PAIN: 0
NAUSEA: 0
HOT FLASHES: 0
TROUBLE SWALLOWING: 0
BACK PAIN: 0
COUGH: 0
VOMITING: 0
BREAST MASS: 0
DEPRESSION: 0
BOWEL INCONTINENCE: 0
HEADACHES: 0
POLYPHAGIA: 0
RESPIRATORY PAIN: 0
NECK MASS: 0
DYSURIA: 0
FATIGUE: 0
DECREASED LIBIDO: 1
HYPERTENSION: 0
NERVOUS/ANXIOUS: 0
MEMORY LOSS: 0
STIFFNESS: 0
DOUBLE VISION: 0
TASTE DISTURBANCE: 0
WEIGHT LOSS: 0
MYALGIAS: 0
NUMBNESS: 0
ABDOMINAL PAIN: 0
DIFFICULTY URINATING: 0
SNORES LOUDLY: 0
NAIL CHANGES: 0
POLYDIPSIA: 0
JOINT SWELLING: 0
BRUISES/BLEEDS EASILY: 0
CLAUDICATION: 0

## 2021-10-07 ASSESSMENT — ANXIETY QUESTIONNAIRES
GAD7 TOTAL SCORE: 0
5. BEING SO RESTLESS THAT IT IS HARD TO SIT STILL: NOT AT ALL
GAD7 TOTAL SCORE: 0
4. TROUBLE RELAXING: NOT AT ALL
1. FEELING NERVOUS, ANXIOUS, OR ON EDGE: NOT AT ALL
2. NOT BEING ABLE TO STOP OR CONTROL WORRYING: NOT AT ALL
8. IF YOU CHECKED OFF ANY PROBLEMS, HOW DIFFICULT HAVE THESE MADE IT FOR YOU TO DO YOUR WORK, TAKE CARE OF THINGS AT HOME, OR GET ALONG WITH OTHER PEOPLE?: NOT DIFFICULT AT ALL
7. FEELING AFRAID AS IF SOMETHING AWFUL MIGHT HAPPEN: NOT AT ALL
GAD7 TOTAL SCORE: 0
7. FEELING AFRAID AS IF SOMETHING AWFUL MIGHT HAPPEN: NOT AT ALL
6. BECOMING EASILY ANNOYED OR IRRITABLE: NOT AT ALL
5. BEING SO RESTLESS THAT IT IS HARD TO SIT STILL: NOT AT ALL
6. BECOMING EASILY ANNOYED OR IRRITABLE: NOT AT ALL
1. FEELING NERVOUS, ANXIOUS, OR ON EDGE: NOT AT ALL
2. NOT BEING ABLE TO STOP OR CONTROL WORRYING: NOT AT ALL
7. FEELING AFRAID AS IF SOMETHING AWFUL MIGHT HAPPEN: NOT AT ALL
3. WORRYING TOO MUCH ABOUT DIFFERENT THINGS: NOT AT ALL
GAD7 TOTAL SCORE: 0
3. WORRYING TOO MUCH ABOUT DIFFERENT THINGS: NOT AT ALL

## 2021-10-07 ASSESSMENT — PATIENT HEALTH QUESTIONNAIRE - PHQ9: 5. POOR APPETITE OR OVEREATING: NOT AT ALL

## 2021-10-07 ASSESSMENT — MIFFLIN-ST. JEOR: SCORE: 1557.99

## 2021-10-07 NOTE — LETTER
10/7/2021       RE: Brittanie Tavares  [address] [facility] [address]     Dear Colleague,    Thank you for referring your patient, Brittanie Tavares, to the [facility] [place] at [facility]. Please see a copy of my visit note below.      Progress Note    SUBJECTIVE:  Brittanie Tavares is an 51 year old, , who requests a breast and pelvic exam.  Patient is followed by Dr. Haley for primary care.  Brittanie had a ANALI with bilateral salpingectomy in  for heavy menstrual bleeding and fibroids.     Concerns today include:     1. Low libido and difficulty with arousal: No intercourse for about 1 yr after hysterectomy due to recovery time; when she did become sexually active, she reports low libido and difficulty with arousal. Had pain at first, but denies dyspareunia now. Reports good relationship, same partner since surgery (). Denies mental health concerns. Expresses low interest in sexual activity and difficulty becoming aroused which are bothersome to patient. Feels that she went through menopause in the days after her surgery. Has questions about whether her ovaries remain in place or if they were removed during her ANALI with salpingectomy surgery.     2. Nocturia & incontinence, only at night: Reports having to get up to urinate 3-4 times per night. When she doesn't get up that frequently, bladder is often full when she does get up and will occasionally have incontinence.  Does not have incontinence any other time of the day. Does not drink caffeine. Drinks ~16 oz of water after dinner and several glasses throughout the day. Denies dysuria, fever, flank pain.  No daytime symptoms.       Menstrual History:  Menstrual History 2015 2015 3/13/2015   LAST MENSTRUAL PERIOD 2015 2015 3/13/2015       Last pap smear: prior to hysterectomy   History of abnormal Pap smear: no hx of ODELL 2 or greater; Status post  benign hysterectomy. Health Maintenance and Surgical History updated.    Mammogram: 9/15/2021 normal result    HISTORY:  Prescription Medications as of 10/15/2021       Rx Number Disp Refills Start End Last Dispensed Date Next Fill Date Owning Pharmacy    desoximetasone (TOPICORT) 0.25 % external cream            Sig: Apply 1 Application topically    Class: Historical    Route: Topical    ketoconazole (NIZORAL) 2 % external shampoo  120 mL 5 10/6/2020    [facility]pharmacy #[id number] [place], [place] - [address]    Sig: Massage into scalp, let sit 3-5 min, then rinse. Use 2-3 times per week    Class: E-Prescribe    tacrolimus (PROTOPIC) 0.1 % external ointment  60 g 2 2021    [facility]/pharmacy #[id number] - [place], [place] - [address]    Sig: Apply topically 2 times daily    Class: E-Prescribe    Route: Topical    Prior authorization: Closed - Prior Authorization not required for patient/medication        No Known Allergies  Immunization History   Administered Date(s) Administered     COVID-19,PF,Pfizer 2021, 2021       OB History    Para Term  AB Living   1 1 1 0 0 1   SAB TAB Ectopic Multiple Live Births   0 0 0 0 0     Past Medical History:   Diagnosis Date     Anemia     Eventually resolved following hysterectomy     Anesthesia complication     Slow to awaken     Fibroid     Resolved by hysterectomy     Fibroids      History of blood transfusion     Needed due to fibroids     Infertility      Sleep apnea     not formally diagnosed--was noted to have decreased O2 sats when sleeping      Varicella     Had when I was a child     Past Surgical History:   Procedure Laterality Date     ABDOMEN SURGERY  1998    C section     C TOTAL HIP ARTHROPLASTY      left     C/SECTION, CLASSICAL       DILATE CERVIX, ABLATE ENDOMETRIUM HYDROTHERMAL, COMBINED N/A 2015    Procedure: COMBINED DILATE CERVIX, ABLATE ENDOMETRIUM HYDROTHERMAL;  Surgeon: Avery  Viviana Escobar MD;  Location: [facility] OR     HYSTERECTOMY TOTAL ABDOMINAL N/A 3/13/2015    Procedure: HYSTERECTOMY TOTAL ABDOMINAL;  Surgeon: Viviana Varela MD;  Location: [facility] OR     hysteroscopic myomectomy  2010     SALPINGECTOMY Bilateral 3/13/2015    Procedure: SALPINGECTOMY;  Surgeon: Viviana Varela MD;  Location: [facility] OR     Family History   Problem Relation Age of Onset     Breast Cancer Mother      Substance Abuse Father      Depression Father      Diabetes Father      Myocardial Infarction Maternal Grandmother      Coronary Artery Disease Maternal Grandmother      Hypertension Maternal Grandmother      Anxiety Disorder Brother      Mental Illness Brother      Substance Abuse Brother      Depression Brother      Diabetes No family hx of      Coronary Artery Disease No family hx of      Hypertension No family hx of      Melanoma No family hx of      Skin Cancer No family hx of      Social History     Socioeconomic History     Marital status:      Spouse name: None     Number of children: None     Years of education: None     Highest education level: None   Occupational History     Occupation: tech support     Employer: [employer]   Tobacco Use     Smoking status: Former Smoker     Packs/day: 0.00     Years: 19.00     Pack years: 0.00     Start date: 3/31/1986     Quit date: 3/12/2005     Years since quittin.5     Smokeless tobacco: Never Used     Tobacco comment: quit 2006   Substance and Sexual Activity     Alcohol use: Yes     Alcohol/week: 0.8 - 4.2 standard drinks     Comment: couple drinks per day on average     Drug use: No     Sexual activity: Yes     Partners: Male     Birth control/protection: Post-menopausal, Female Surgical   Other Topics Concern     Parent/sibling w/ CABG, MI or angioplasty before 65F 55M? Not Asked   Social History Narrative    1/14/15    , one child    Works at a desk.    Viviana Varela MD     Social Determinants of Health     Financial Resource Strain:       Difficulty of Paying Living Expenses:    Food Insecurity:      Worried About Running Out of Food in the Last Year:      Ran Out of Food in the Last Year:    Transportation Needs:      Lack of Transportation (Medical):      Lack of Transportation (Non-Medical):    Physical Activity:      Days of Exercise per Week:      Minutes of Exercise per Session:    Stress:      Feeling of Stress :    Social Connections:      Frequency of Communication with Friends and Family:      Frequency of Social Gatherings with Friends and Family:      Attends [employer] Services:      Active Member of Clubs or Organizations:      Attends Club or Organization Meetings:      Marital Status:    Intimate Partner Violence:      Fear of Current or Ex-Partner:      Emotionally Abused:      Physically Abused:      Sexually Abused:        Review of Systems     Constitutional:  Negative for fever, chills, weight loss, weight gain, fatigue, decreased appetite, night sweats, recent stressors, height gain, height loss, post-operative complications, incisional pain, hallucinations, increased energy, hyperactivity and confused.   HENT:  Negative for ear pain, hearing loss, tinnitus, nosebleeds, trouble swallowing, hoarse voice, mouth sores, sore throat, ear discharge, tooth pain, gum tenderness, taste disturbance, smell disturbance, hearing aid, bleeding gums, dry mouth, sinus pain, sinus congestion and neck mass.    Eyes:  Negative for double vision, pain, redness, eye pain, decreased vision, eye watering, eye bulging, eye dryness, flashing lights, spots, floaters, strabismus, tunnel vision, jaundice and eye irritation.   Respiratory:   Negative for cough, hemoptysis, sputum production, shortness of breath, wheezing, sleep disturbances due to breathing, snores loudly, respiratory pain, dyspnea on exertion, cough disturbing sleep and postural dyspnea.    Cardiovascular:  Negative for chest pain, dyspnea on exertion, palpitations, orthopnea,  claudication, leg swelling, fingers/toes turn blue, hypertension, hypotension, syncope, history of heart murmur, chest pain on exertion, chest pain at rest, pacemaker, few scattered varicosities, leg pain, sleep disturbances due to breathing, tachycardia, light-headedness, exercise intolerance and edema.   Gastrointestinal:  Positive for heartburn, diarrhea, constipation and change in stool. Negative for nausea, vomiting, abdominal pain, blood in stool, melena, rectal pain, bloating, bowel incontinence, jaundice and coffee ground emesis.   Genitourinary:  Positive for bladder incontinence, decreased libido, nocturia and arousal difficulty. Negative for dysuria, urgency, hematuria, flank pain, vaginal discharge, difficulty urinating, genital sores, dyspareunia, voiding less frequently, abnormal vaginal bleeding, excessive menstruation, menstrual changes, hot flashes, vaginal dryness and postmenopausal bleeding.   Musculoskeletal:  Negative for myalgias, back pain, joint swelling, arthralgias, stiffness, muscle cramps, neck pain, bone pain, muscle weakness and fracture.   Skin:  Negative for nail changes, itching, poor wound healing, rash, hair changes, skin changes, acne, warts, poor wound healing, scarring, flaky skin, Raynaud's phenomenon, sensitivity to sunlight and skin thickening.   Neurological:  Negative for dizziness, tingling, tremors, speech change, seizures, loss of consciousness, weakness, light-headedness, numbness, headaches, disturbances in coordination, extremity numbness, memory loss, difficulty walking and paralysis.   Endo/Heme:  Negative for anemia, swollen glands and bruises/bleeds easily.   Psychiatric/Behavioral:  Negative for depression, hallucinations, memory loss, decreased concentration, mood swings and panic attacks.    Breast:  Negative for breast discharge, breast mass, breast pain and nipple retraction.   Endocrine:  Negative for altered temperature regulation, polyphagia, polydipsia,  "unwanted hair growth and change in facial hair.    Answers for HPI/ROS submitted by the patient on 10/7/2021  MINA 7 TOTAL SCORE: 0    EXAM:  Blood pressure 112/77, pulse 63, height 1.651 m (5' 5\"), weight 94.7 kg (208 lb 12.8 oz), last menstrual period 03/13/2015, not currently breastfeeding. Body mass index is 34.75 kg/m .  General appearance: Pleasant female in no acute distress.     BREAST EXAM:  Breast: Without visible skin changes. No dimpling or lesions seen.   Breasts supple, non-tender with palpation, no dominant mass, nodularity, or nipple discharge noted bilaterally. Axillary nodes negative.      PELVIC EXAM:  EG/BUS: Normal genital architecture without lesions, erythema or abnormal secretions; Bartholin's, Urethra, Herington's normal; pelvic floor weakness with Kegal   Urethral meatus: normal   Urethra: no masses, tenderness, or scarring   Bladder: no masses or tenderness   Cervix: surgically absent; vaginal cuff intact  Uterus: surgically absent  Adnexa: Not palpable    ASSESSMENT:  Encounter Diagnoses   Name Primary?     Screen for STD (sexually transmitted disease)      Low libido      Urgency incontinence      Encounter for gynecological examination (general) (routine) without abnormal findings Yes        PLAN:   Orders Placed This Encounter   Procedures     [facility] Referral     Physical Therapy Referral     1. Low libido: [facility]; counseled on many factors that may play a role in libido and arousal.   2. Nocturia/ Incontinence at night: Recommended limiting intake of fluids after dinner; recommended Kegal exercises and pelvic floor PT for management of incontinence. Offered referral to urology, pt declined at this time.  3. Preventive health: Counseled pt that her ovaries remain in place and clarified op note and pathology findings with pt.  Counseled on recommendations re: gyn preventive health. Recommend yearly clinical breast exam and mammograms " (currently up to date); limited evidence for routine pelvic exams; recommend pelvic exams if symptoms present. No need for further pap smears. STD testing completed today.      Return in one year/PRN for preventive care or problems/concerns.     Verbalized understanding and agreement with visit plan.  Rachel Trammell, DNP, APRN, WHNP

## 2021-10-08 ENCOUNTER — TELEPHONE (OUTPATIENT)
Dept: ENDOCRINOLOGY | Facility: CLINIC | Age: 51
End: 2021-10-08

## 2021-10-08 LAB
C TRACH DNA SPEC QL NAA+PROBE: NEGATIVE
N GONORRHOEA DNA SPEC QL NAA+PROBE: NEGATIVE

## 2021-10-08 ASSESSMENT — ANXIETY QUESTIONNAIRES: GAD7 TOTAL SCORE: 0

## 2021-10-08 NOTE — TELEPHONE ENCOUNTER
TC to patient to review her concerns about her hysterectomy from 2015 and her thoughts about having had her ovaries removed at that time.     Reviewed all notes and discussions -- reviewed one erroneous note that says HERIBERTO.  We discussed that was likely a typo.     Patient reassured.   My sincerest apologies offered for misunderstanding.     Gratitude to Dr. Trammell who cleared this up.   Viviana Varela MD

## 2021-10-11 ENCOUNTER — IMMUNIZATION (OUTPATIENT)
Dept: NURSING | Facility: CLINIC | Age: 51
End: 2021-10-11
Payer: COMMERCIAL

## 2021-10-11 PROCEDURE — 0004A PR COVID VAC PFIZER DIL RECON 30 MCG/0.3 ML IM: CPT | Performed by: FAMILY MEDICINE

## 2021-10-11 PROCEDURE — 91300 PR COVID VAC PFIZER DIL RECON 30 MCG/0.3 ML IM: CPT | Performed by: FAMILY MEDICINE

## 2021-12-14 ENCOUNTER — OFFICE VISIT (OUTPATIENT)
Dept: DERMATOLOGY | Facility: CLINIC | Age: 51
End: 2021-12-14
Payer: COMMERCIAL

## 2021-12-14 DIAGNOSIS — L40.9 PSORIASIS: Primary | ICD-10-CM

## 2021-12-14 DIAGNOSIS — L21.9 DERMATITIS, SEBORRHEIC: ICD-10-CM

## 2021-12-14 PROCEDURE — 11900 INJECT SKIN LESIONS </W 7: CPT | Performed by: DERMATOLOGY

## 2021-12-14 PROCEDURE — 99213 OFFICE O/P EST LOW 20 MIN: CPT | Mod: 25 | Performed by: DERMATOLOGY

## 2021-12-14 RX ORDER — DESOXIMETASONE 0.5 MG/G
GEL TOPICAL
Qty: 60 G | Refills: 3 | Status: SHIPPED | OUTPATIENT
Start: 2021-12-14 | End: 2022-03-17

## 2021-12-14 RX ORDER — BACITRACIN ZINC 500 [USP'U]/G
1 OINTMENT TOPICAL PRN
COMMUNITY

## 2021-12-14 ASSESSMENT — PAIN SCALES - GENERAL: PAINLEVEL: NO PAIN (0)

## 2021-12-14 NOTE — NURSING NOTE
Drug Administration Record    Prior to injection, verified patient identity using patient's name and date of birth.  Due to injection administration, patient instructed to remain in clinic for 15 minutes  afterwards, and to report any adverse reaction to me immediately.    Drug Name: triamcinolone acetonide(kenalog)  Dose: 0.2mL of triamcinolone 10mg/mL, 2mg dose  Route administered: ID  NDC #: Kenalog-10 (9364-1351-97)  Amount of waste(mL):4.8mL  Reason for waste: Multi dose vial    LOT #: [id number]  SITE: skin  : [facility]  EXPIRATION DATE: 4/2023    LAVELLE Martini

## 2021-12-14 NOTE — LETTER
12/14/2021       RE: Brittanie Tavares  [address] [facility] [address]     Dear Colleague,    Thank you for referring your patient, Brittanie Tavares, to the [facility] [place] at [facility]. Please see a copy of my visit note below.    CHIEF COMPLAINT:  Followup on seborrheic dermatitis and perioral dermatitis.    SUBJECTIVE:  Brittanie is a very pleasant 51-year-old female with a history of seborrheic dermatitis affecting her face in a frequently perioral distribution.  She was last seen in our clinic on 06/01/2021, at which time we switched her from pimecrolimus cream to tacrolimus ointment.  Today she reports improved control with tacrolimus ointment.  She has recurrence occasionally of redness, scaling and soreness around her lips and the inferior aspect of her nose and tends to use this ointment 2-3 times every 2 weeks.  She is hesitant to overuse it as she was worried it may lose its potency.  Overall, she notices diminished frequency of flares and overall decreased intensity of symptoms but still has some recurrences.  Today she also notes a persistent scaly, itchy spot on her occipital scalp, which previously has been assessed to represent seborrheic dermatitis.  She has used desoximetasone gel in the area with some improvement but is wondering if there is anything else to make a better persistent relief.    REVIEW OF SYSTEMS:  No recent fevers.    PHYSICAL EXAMINATION:    GENERAL:  This is a well-appearing, well-nourished female with a normal mood and affect who is oriented x3.  SKIN:  A cutaneous exam of the head, neck and bilateral upper extremities was performed.  In a perioral distribution as well as inferior to the nares, on the central face, there are faint pink, erythematous, ill-defined somewhat eczematous appearing papules and thin plaques with fine scale.  On the midline occiput, there is a well-marginated  thickly scaled, approximately 2 cm flat topped erythematous plaque.    ASSESSMENT AND PLAN:    1.  Seborrheic dermatitis and perioral dermatitis.  She is overall improved with Protopic 0.1% ointment.  I encouraged her to use this prescription on a more frequent basis, twice daily for 3-4 days at a time when she has flareups.  She was interested in better control and will attempt this.  2.  Psoriasis versus seborrheic dermatitis of the occipital scalp.  Today the lesion has a morphology most consistent with limited plaque-type psoriasis.  After informed verbal consent, the area was swabbed with an alcohol pad and injected with 0.2 mL of Kenalog 10 mg/mL. The patient tolerated this without complication.  We also refilled her prescription for desoximetasone gel to be applied twice daily for flareups.  3.  We will plan to see her back in clinic in 3 to 4 months' time.        Gianfranco Tierney MD  Dermatology Attending

## 2021-12-14 NOTE — NURSING NOTE
"Dermatology Rooming Note    Brittanie Tavares's goals for this visit include:   Chief Complaint   Patient presents with     Derm Problem     Brittanie is here today for her scalp. She states \" I have a patch on the back of my head that is dry and itchy. I now have a lump and I am using the medication. I would really like a more permatnent solution. The stuff on my face is not going away\"     Aura Gillespie, RMYOVANI  "

## 2021-12-14 NOTE — PROGRESS NOTES
CHIEF COMPLAINT:  Followup on seborrheic dermatitis and perioral dermatitis.    SUBJECTIVE:  Brittanie is a very pleasant 51-year-old female with a history of seborrheic dermatitis affecting her face in a frequently perioral distribution.  She was last seen in our clinic on 06/01/2021, at which time we switched her from pimecrolimus cream to tacrolimus ointment.  Today she reports improved control with tacrolimus ointment.  She has recurrence occasionally of redness, scaling and soreness around her lips and the inferior aspect of her nose and tends to use this ointment 2-3 times every 2 weeks.  She is hesitant to overuse it as she was worried it may lose its potency.  Overall, she notices diminished frequency of flares and overall decreased intensity of symptoms but still has some recurrences.  Today she also notes a persistent scaly, itchy spot on her occipital scalp, which previously has been assessed to represent seborrheic dermatitis.  She has used desoximetasone gel in the area with some improvement but is wondering if there is anything else to make a better persistent relief.    REVIEW OF SYSTEMS:  No recent fevers.    PHYSICAL EXAMINATION:    GENERAL:  This is a well-appearing, well-nourished female with a normal mood and affect who is oriented x3.  SKIN:  A cutaneous exam of the head, neck and bilateral upper extremities was performed.  In a perioral distribution as well as inferior to the nares, on the central face, there are faint pink, erythematous, ill-defined somewhat eczematous appearing papules and thin plaques with fine scale.  On the midline occiput, there is a well-marginated thickly scaled, approximately 2 cm flat topped erythematous plaque.    ASSESSMENT AND PLAN:    1.  Seborrheic dermatitis and perioral dermatitis.  She is overall improved with Protopic 0.1% ointment.  I encouraged her to use this prescription on a more frequent basis, twice daily for 3-4 days at a time when she has flareups.   She was interested in better control and will attempt this.  2.  Psoriasis versus seborrheic dermatitis of the occipital scalp.  Today the lesion has a morphology most consistent with limited plaque-type psoriasis.  After informed verbal consent, the area was swabbed with an alcohol pad and injected with 0.2 mL of Kenalog 10 mg/mL. The patient tolerated this without complication.  We also refilled her prescription for desoximetasone gel to be applied twice daily for flareups.  3.  We will plan to see her back in clinic in 3 to 4 months' time.        Gianfranco Tierney MD  Dermatology Attending

## 2021-12-20 ENCOUNTER — TELEPHONE (OUTPATIENT)
Dept: DERMATOLOGY | Facility: CLINIC | Age: 51
End: 2021-12-20
Payer: COMMERCIAL

## 2021-12-20 NOTE — TELEPHONE ENCOUNTER
PA Initiation    Medication: desoximetasone (TOPICORT) 0.05 % GEL  Insurance Company: [employer] - Phone [phone] Fax [phone]  Pharmacy Filling the Rx: [id number] - [place], [place] - [address] AT [address] [facility]  Filling Pharmacy Phone: [phone]  Filling Pharmacy Fax: [phone]  Start Date: 12/20/2021

## 2021-12-20 NOTE — TELEPHONE ENCOUNTER
Prior Authorization Retail Medication Request    Medication/Dose: desoximetasone (TOPICORT) 0.05 % GEL  ICD code (if different than what is on RX):  Psoriasis [L40.9]  - Primary   Previously Tried and Failed:  See below  Rationale:  Step thereapy req'd    Insurance Name:  [employer]  Insurance ID:  [id number]      Pharmacy Information (if different than what is on RX)  Name:  [id number]  Phone:  [phone]

## 2021-12-21 NOTE — TELEPHONE ENCOUNTER
Prior Authorization Approval    Authorization Effective Date: 11/20/2021  Authorization Expiration Date: 12/20/2022  Medication: desoximetasone (TOPICORT) 0.05 % GEL--APPROVED  Approved Dose/Quantity:   Reference #:     Insurance Company: [employer] - Phone [phone] Fax [phone]  Expected CoPay:       CoPay Card Available:      Foundation Assistance Needed:    Which Pharmacy is filling the prescription (Not needed for infusion/clinic administered): [id number] - [place], [place] - [address] [facility]  Pharmacy Notified: Yes  Patient Notified: Yes **Instructed pharmacy to notify patient when script is ready to /ship.**

## 2022-01-09 PROBLEM — L40.9 PSORIASIS: Status: ACTIVE | Noted: 2022-01-09

## 2022-02-19 ENCOUNTER — HEALTH MAINTENANCE LETTER (OUTPATIENT)
Age: 52
End: 2022-02-19

## 2022-03-17 ENCOUNTER — OFFICE VISIT (OUTPATIENT)
Dept: DERMATOLOGY | Facility: CLINIC | Age: 52
End: 2022-03-17
Payer: COMMERCIAL

## 2022-03-17 DIAGNOSIS — L40.9 PSORIASIS: ICD-10-CM

## 2022-03-17 DIAGNOSIS — L71.0 PERIORAL DERMATITIS: ICD-10-CM

## 2022-03-17 DIAGNOSIS — L21.9 DERMATITIS, SEBORRHEIC: Primary | ICD-10-CM

## 2022-03-17 PROCEDURE — 99214 OFFICE O/P EST MOD 30 MIN: CPT | Mod: GC | Performed by: DERMATOLOGY

## 2022-03-17 RX ORDER — TACROLIMUS 1 MG/G
OINTMENT TOPICAL 2 TIMES DAILY
Qty: 60 G | Refills: 2 | Status: SHIPPED | OUTPATIENT
Start: 2022-03-17

## 2022-03-17 RX ORDER — DESOXIMETASONE 0.5 MG/G
GEL TOPICAL
Qty: 60 G | Refills: 3 | Status: SHIPPED | OUTPATIENT
Start: 2022-03-17

## 2022-03-17 ASSESSMENT — PAIN SCALES - GENERAL: PAINLEVEL: NO PAIN (0)

## 2022-03-17 NOTE — PROGRESS NOTES
[facility] Dermatology Note  Encounter Date: Mar 17, 2022  Office Visit     Dermatology Problem List:  # Perioral dermatitis/seborrheic dermatitis   # Sebopsoriasis occipital scalp     ____________________________________________    Assessment & Plan:   # Perioral dermatitis/seborrheic dermatitis   Continues to have waxing waning course, and rash typically responds to Protopic twice daily use when flaring.  No dermatitis noted on exam today.  Encouraged that it is safe to use Protopic on a consistent basis as this is a steroid sparing topical.  Advised to use twice weekly as maintenance to prevent future flares.  Alternatively, can use plain Vaseline several times throughout the week to encourage skin hydration.  - Continue protopic    # Sebopsoriasis occipital scalp   Chronic with occasional flare.  Responded well to IL K previously but unfortunately had recurrence.  Currently using desoximetasone gel 1-2 times weekly as needed which helps.  Advised that it is safe to use this medication on a consistent basis to the scalp without risk of side effects of topical corticosteroid such as skin thinning.  Again recommended twice weekly use for maintenance when dermatitis resolved to prevent recurrent flares.  - Continue desoximetasone gel     Procedures Performed:   None    Follow-up: 6 months, prn for new or changing lesions    Staff and Resident:     Kayode Ledesma MD  PGY-2 Dermatology  Pager: [phone]    I have seen and examined this patient and agree with the assessment and plan as documented in the resident's note.    Gianfranco Tierney MD  Dermatology Attending    ____________________________________________    CC: Derm Problem (Brittanie is here today for psoriasis, reports little to no improvement. Last injection seemed to help temporarily.  )    HPI:  Ms. Brittanie Tavares is a(n) 52 year old female who presents today as a return patient for scalp and face concerns.    Patient states today that her  perioral dermatitis waxes and wanes.  Today is a good day.  Occasionally has used her Protopic for flares.  Does not like applying in the mornings as she develops tongue burning sensation and does not like the taste when sitting on drink/eating.  Additionally has been using the desoximetasone gel 1-2 times per week with improvement of her scalp pruritus.  Does not use it on a more regular basis due to concerns of potential side effects.  Otherwise been feeling well without any additional skin concerns.    Labs Reviewed:  N/A    Physical Exam:  Vitals: [facility] 03/13/2015   SKIN: Focused examination of face and scalp was performed.  - Face clear of dermatitis  - Faint erythematous well demarcated plaques with loose scale of the occiput   - No other lesions of concern on areas examined.     Medications:  Current Outpatient Medications   Medication     bacitracin 500 UNIT/GM external ointment     desoximetasone (TOPICORT) 0.05 % GEL     desoximetasone (TOPICORT) 0.25 % external cream     tacrolimus (PROTOPIC) 0.1 % external ointment     ketoconazole (NIZORAL) 2 % external shampoo     Current Facility-Administered Medications   Medication     triamcinolone acetonide (KENALOG-10) injection 10 mg      Past Medical History:   Patient Active Problem List   Diagnosis     Menorrhagia     Fibroids, intramural     Dermatitis, seborrheic     Lip fissure     Psoriasis     Past Medical History:   Diagnosis Date     Anemia     Eventually resolved following hysterectomy     Anesthesia complication     Slow to awaken     Fibroid     Resolved by hysterectomy     Fibroids      History of blood transfusion     Needed due to fibroids     Infertility      Sleep apnea     not formally diagnosed--was noted to have decreased O2 sats when sleeping      Varicella     Had when I was a child       CC Referred Self, MD  No address on file on close of this encounter.

## 2022-03-17 NOTE — NURSING NOTE
Dermatology Rooming Note    Brittanie Tavares's goals for this visit include:   Chief Complaint   Patient presents with     Derm Problem     Brittanie is here today for psoriasis, reports little to no improvement. Last injection seemed to help temporarily.       Becki Kirby, EMT

## 2022-03-17 NOTE — PATIENT INSTRUCTIONS
Vaseline a couple times per week to lips  Continue Protopic as needed for flares, can use a few times per week as maintenance when not flaring  Continue desoximetasone gel to scalp 1-2 times daily

## 2022-03-17 NOTE — LETTER
3/17/2022       RE: Brittanie Tavares  [address] [facility] [address]     Dear Colleague,    Thank you for referring your patient, Brittanie Tavares, to the [facility] [place] [facility]. Please see a copy of my visit note below.    [facility] Dermatology Note  Encounter Date: Mar 17, 2022  Office Visit     Dermatology Problem List:  # Perioral dermatitis/seborrheic dermatitis   # Sebopsoriasis occipital scalp     ____________________________________________    Assessment & Plan:   # Perioral dermatitis/seborrheic dermatitis   Continues to have waxing waning course, and rash typically responds to Protopic twice daily use when flaring.  No dermatitis noted on exam today.  Encouraged that it is safe to use Protopic on a consistent basis as this is a steroid sparing topical.  Advised to use twice weekly as maintenance to prevent future flares.  Alternatively, can use plain Vaseline several times throughout the week to encourage skin hydration.  - Continue protopic    # Sebopsoriasis occipital scalp   Chronic with occasional flare.  Responded well to IL K previously but unfortunately had recurrence.  Currently using desoximetasone gel 1-2 times weekly as needed which helps.  Advised that it is safe to use this medication on a consistent basis to the scalp without risk of side effects of topical corticosteroid such as skin thinning.  Again recommended twice weekly use for maintenance when dermatitis resolved to prevent recurrent flares.  - Continue desoximetasone gel     Procedures Performed:   None    Follow-up: 6 months, prn for new or changing lesions    Staff and Resident:     Kayode Ledesma MD  PGY-2 Dermatology  Pager: [phone]    I have seen and examined this patient and agree with the assessment and plan as documented in the resident's note.    Gianfranco Tierney MD  Dermatology  Attending    ____________________________________________    CC: Derm Problem (Brittanie is here today for psoriasis, reports little to no improvement. Last injection seemed to help temporarily.  )    HPI:  Ms. Brittanie Tavares is a(n) 52 year old female who presents today as a return patient for scalp and face concerns.    Patient states today that her perioral dermatitis waxes and wanes.  Today is a good day.  Occasionally has used her Protopic for flares.  Does not like applying in the mornings as she develops tongue burning sensation and does not like the taste when sitting on drink/eating.  Additionally has been using the desoximetasone gel 1-2 times per week with improvement of her scalp pruritus.  Does not use it on a more regular basis due to concerns of potential side effects.  Otherwise been feeling well without any additional skin concerns.    Labs Reviewed:  N/A    Physical Exam:  Vitals: [facility] 03/13/2015   SKIN: Focused examination of face and scalp was performed.  - Face clear of dermatitis  - Faint erythematous well demarcated plaques with loose scale of the occiput   - No other lesions of concern on areas examined.     Medications:  Current Outpatient Medications   Medication     bacitracin 500 UNIT/GM external ointment     desoximetasone (TOPICORT) 0.05 % GEL     desoximetasone (TOPICORT) 0.25 % external cream     tacrolimus (PROTOPIC) 0.1 % external ointment     ketoconazole (NIZORAL) 2 % external shampoo     Current Facility-Administered Medications   Medication     triamcinolone acetonide (KENALOG-10) injection 10 mg      Past Medical History:   Patient Active Problem List   Diagnosis     Menorrhagia     Fibroids, intramural     Dermatitis, seborrheic     Lip fissure     Psoriasis     Past Medical History:   Diagnosis Date     Anemia     Eventually resolved following hysterectomy     Anesthesia complication     Slow to awaken     Fibroid     Resolved by hysterectomy     Fibroids      History of blood  transfusion     Needed due to fibroids     Infertility      Sleep apnea     not formally diagnosed--was noted to have decreased O2 sats when sleeping      Varicella     Had when I was a child       CC Referred Self, MD  No address on file on close of this encounter.

## 2022-03-26 PROBLEM — L71.0 PERIORAL DERMATITIS: Status: ACTIVE | Noted: 2022-03-26

## 2022-10-22 ENCOUNTER — HEALTH MAINTENANCE LETTER (OUTPATIENT)
Age: 52
End: 2022-10-22

## 2022-12-04 ENCOUNTER — HEALTH MAINTENANCE LETTER (OUTPATIENT)
Age: 52
End: 2022-12-04

## 2023-03-08 ENCOUNTER — APPOINTMENT (OUTPATIENT)
Dept: MRI IMAGING | Facility: CLINIC | Age: 53
End: 2023-03-08
Attending: EMERGENCY MEDICINE
Payer: COMMERCIAL

## 2023-03-08 ENCOUNTER — HOSPITAL ENCOUNTER (EMERGENCY)
Facility: CLINIC | Age: 53
Discharge: HOME | End: 2023-03-09
Attending: EMERGENCY MEDICINE | Admitting: EMERGENCY MEDICINE
Payer: COMMERCIAL

## 2023-03-08 ENCOUNTER — APPOINTMENT (OUTPATIENT)
Dept: CT IMAGING | Facility: CLINIC | Age: 53
End: 2023-03-08
Attending: EMERGENCY MEDICINE
Payer: COMMERCIAL

## 2023-03-08 VITALS
HEART RATE: 71 BPM | SYSTOLIC BLOOD PRESSURE: 146 MMHG | WEIGHT: 210 LBS | TEMPERATURE: 97.8 F | BODY MASS INDEX: 34.99 KG/M2 | RESPIRATION RATE: 16 BRPM | OXYGEN SATURATION: 99 % | HEIGHT: 65 IN | DIASTOLIC BLOOD PRESSURE: 84 MMHG

## 2023-03-08 DIAGNOSIS — H53.9 VISION CHANGES: ICD-10-CM

## 2023-03-08 LAB
ALBUMIN SERPL BCG-MCNC: 4.5 G/DL (ref 3.5–5.2)
ALP SERPL-CCNC: 97 U/L (ref 35–104)
ALT SERPL W P-5'-P-CCNC: 19 U/L (ref 10–35)
ANION GAP SERPL CALCULATED.3IONS-SCNC: 12 MMOL/L (ref 7–15)
APTT PPP: 28 SECONDS (ref 22–38)
AST SERPL W P-5'-P-CCNC: 18 U/L (ref 10–35)
BASOPHILS # BLD AUTO: 0.1 10E3/UL (ref 0–0.2)
BASOPHILS NFR BLD AUTO: 1 %
BILIRUB SERPL-MCNC: 0.3 MG/DL
BUN SERPL-MCNC: 11.4 MG/DL (ref 6–20)
CALCIUM SERPL-MCNC: 9.5 MG/DL (ref 8.6–10)
CHLORIDE SERPL-SCNC: 103 MMOL/L (ref 98–107)
CREAT SERPL-MCNC: 0.65 MG/DL (ref 0.51–0.95)
CRP SERPL-MCNC: 8.99 MG/L
DEPRECATED HCO3 PLAS-SCNC: 26 MMOL/L (ref 22–29)
EOSINOPHIL # BLD AUTO: 0.3 10E3/UL (ref 0–0.7)
EOSINOPHIL NFR BLD AUTO: 4 %
ERYTHROCYTE [DISTWIDTH] IN BLOOD BY AUTOMATED COUNT: 13.2 % (ref 10–15)
ERYTHROCYTE [SEDIMENTATION RATE] IN BLOOD BY WESTERGREN METHOD: 9 MM/HR (ref 0–30)
GFR SERPL CREATININE-BSD FRML MDRD: >90 ML/MIN/1.73M2
GLUCOSE SERPL-MCNC: 93 MG/DL (ref 70–99)
HCT VFR BLD AUTO: 42 % (ref 35–47)
HGB BLD-MCNC: 13.8 G/DL (ref 11.7–15.7)
IMM GRANULOCYTES # BLD: 0 10E3/UL
IMM GRANULOCYTES NFR BLD: 0 %
INR PPP: 0.96 (ref 0.85–1.15)
LYMPHOCYTES # BLD AUTO: 2.1 10E3/UL (ref 0.8–5.3)
LYMPHOCYTES NFR BLD AUTO: 29 %
MCH RBC QN AUTO: 28 PG (ref 26.5–33)
MCHC RBC AUTO-ENTMCNC: 32.9 G/DL (ref 31.5–36.5)
MCV RBC AUTO: 85 FL (ref 78–100)
MONOCYTES # BLD AUTO: 0.5 10E3/UL (ref 0–1.3)
MONOCYTES NFR BLD AUTO: 7 %
NEUTROPHILS # BLD AUTO: 4.2 10E3/UL (ref 1.6–8.3)
NEUTROPHILS NFR BLD AUTO: 59 %
NRBC # BLD AUTO: 0 10E3/UL
NRBC BLD AUTO-RTO: 0 /100
PLATELET # BLD AUTO: 256 10E3/UL (ref 150–450)
POTASSIUM SERPL-SCNC: 4 MMOL/L (ref 3.4–5.3)
PROT SERPL-MCNC: 6.9 G/DL (ref 6.4–8.3)
RBC # BLD AUTO: 4.92 10E6/UL (ref 3.8–5.2)
SODIUM SERPL-SCNC: 141 MMOL/L (ref 136–145)
WBC # BLD AUTO: 7.1 10E3/UL (ref 4–11)

## 2023-03-08 PROCEDURE — 70450 CT HEAD/BRAIN W/O DYE: CPT

## 2023-03-08 PROCEDURE — 70496 CT ANGIOGRAPHY HEAD: CPT

## 2023-03-08 PROCEDURE — 85652 RBC SED RATE AUTOMATED: CPT | Performed by: EMERGENCY MEDICINE

## 2023-03-08 PROCEDURE — 80053 COMPREHEN METABOLIC PANEL: CPT | Performed by: EMERGENCY MEDICINE

## 2023-03-08 PROCEDURE — 36415 COLL VENOUS BLD VENIPUNCTURE: CPT | Performed by: EMERGENCY MEDICINE

## 2023-03-08 PROCEDURE — 70496 CT ANGIOGRAPHY HEAD: CPT | Mod: 26 | Performed by: STUDENT IN AN ORGANIZED HEALTH CARE EDUCATION/TRAINING PROGRAM

## 2023-03-08 PROCEDURE — 70498 CT ANGIOGRAPHY NECK: CPT

## 2023-03-08 PROCEDURE — 99285 EMERGENCY DEPT VISIT HI MDM: CPT | Performed by: EMERGENCY MEDICINE

## 2023-03-08 PROCEDURE — 86140 C-REACTIVE PROTEIN: CPT | Performed by: EMERGENCY MEDICINE

## 2023-03-08 PROCEDURE — 258N000003 HC RX IP 258 OP 636: Performed by: EMERGENCY MEDICINE

## 2023-03-08 PROCEDURE — 85025 COMPLETE CBC W/AUTO DIFF WBC: CPT | Performed by: EMERGENCY MEDICINE

## 2023-03-08 PROCEDURE — 70551 MRI BRAIN STEM W/O DYE: CPT | Mod: 26 | Performed by: RADIOLOGY

## 2023-03-08 PROCEDURE — 85610 PROTHROMBIN TIME: CPT | Performed by: EMERGENCY MEDICINE

## 2023-03-08 PROCEDURE — 96360 HYDRATION IV INFUSION INIT: CPT | Performed by: EMERGENCY MEDICINE

## 2023-03-08 PROCEDURE — 250N000011 HC RX IP 250 OP 636: Performed by: EMERGENCY MEDICINE

## 2023-03-08 PROCEDURE — 85730 THROMBOPLASTIN TIME PARTIAL: CPT | Performed by: EMERGENCY MEDICINE

## 2023-03-08 PROCEDURE — 70551 MRI BRAIN STEM W/O DYE: CPT

## 2023-03-08 PROCEDURE — 70498 CT ANGIOGRAPHY NECK: CPT | Mod: 26 | Performed by: STUDENT IN AN ORGANIZED HEALTH CARE EDUCATION/TRAINING PROGRAM

## 2023-03-08 RX ORDER — IOPAMIDOL 755 MG/ML
75 INJECTION, SOLUTION INTRAVASCULAR ONCE
Status: COMPLETED | OUTPATIENT
Start: 2023-03-08 | End: 2023-03-08

## 2023-03-08 RX ORDER — SODIUM CHLORIDE 9 MG/ML
INJECTION, SOLUTION INTRAVENOUS CONTINUOUS
Status: DISCONTINUED | OUTPATIENT
Start: 2023-03-08 | End: 2023-03-09 | Stop reason: HOSPADM

## 2023-03-08 RX ORDER — ACETAMINOPHEN 325 MG/1
975 TABLET ORAL ONCE
Status: DISCONTINUED | OUTPATIENT
Start: 2023-03-08 | End: 2023-03-09 | Stop reason: HOSPADM

## 2023-03-08 RX ADMIN — SODIUM CHLORIDE 1000 ML: 9 INJECTION, SOLUTION INTRAVENOUS at 23:02

## 2023-03-08 RX ADMIN — IOPAMIDOL 75 ML: 755 INJECTION, SOLUTION INTRAVENOUS at 23:13

## 2023-03-08 ASSESSMENT — ACTIVITIES OF DAILY LIVING (ADL)
ADLS_ACUITY_SCORE: 35
ADLS_ACUITY_SCORE: 35
ADLS_ACUITY_SCORE: 33

## 2023-03-09 ENCOUNTER — TELEPHONE (OUTPATIENT)
Dept: OPHTHALMOLOGY | Facility: CLINIC | Age: 53
End: 2023-03-09
Payer: COMMERCIAL

## 2023-03-09 NOTE — ED TRIAGE NOTES
Triage Assessment     Row Name 03/08/23 4173       Triage Assessment (Adult)    Airway WDL WDL       Respiratory WDL    Respiratory WDL WDL       Skin Circulation/Temperature WDL    Skin Circulation/Temperature WDL WDL       Cardiac WDL    Cardiac WDL WDL       Peripheral/Neurovascular WDL    Peripheral Neurovascular WDL WDL       Cognitive/Neuro/Behavioral WDL    Cognitive/Neuro/Behavioral WDL WDL

## 2023-03-09 NOTE — ED NOTES
Pt declined to have PIV  Placed at this time, labs drawn and sent to lab.        Makenzie Arevalo RN

## 2023-03-09 NOTE — ED TRIAGE NOTES
"Pt arrives ambulatory to triage w/ c/o bilateral vision changes as well as peripheral right-eye vision loss. States she was looking at computer screen ~1630 when she noticed diagonal \"floater line\" in field of vision as well as vision loss in peripheral vison on right eye. Of note, vision is normal. States recently had a cold with a lot of coughing fits. Cough stated as better today.       "

## 2023-03-09 NOTE — DISCHARGE INSTRUCTIONS
Please make an appointment to follow up with Neurology Clinic (phone: [phone]) as soon as possible even if entirely better.    Please make an appointment to follow up with Eye Clinic (phone: [phone]) as soon as possible even if entirely better.

## 2023-03-09 NOTE — CONSULTS
"[facility]    Stroke Consult Note    Reason for Consult:  Intermittent visual changes c/f TIA    Chief Complaint: vision change       HPI  Brittanie aTvares is a 52 year old female with no significant neurological or cardiovascular history who presents with 2 x episodes of transient binocular visual changes followed by headache.    Patient first noticed symptoms last Friday 3/3 when she was at a concert. Patient suddenly had difficulty seeing the stage. \"Everything felt dark except the middle\" but she also could not clearly see in the middle of her vision. This lasted 15-20 minutes. She denied any symptoms during this event but afterwards she had a slight headache on the \"top of my head\" of mild 2-3/10 severity, described as \"an ache\" that lasted ~1 hour.     Patient had a second event today ~1640 while at work when she suddenly had difficulty reading her computer screen. \"I couldn't read parts of things. She also described a \"floating, diagonal line like a floater\" in both eyes. This line shifted with eye movements and was not fixed in her vision. Everything on the right side of the line was very blurry but not totally blacked out. The left side was norrmal. This event lasted ~30 minutes. During this time she could walk around her home without any dizziness, confusion, disorientation, imbalance. Later, around 1745 she experienced another headache similar the prior one. This was also very mild 1-2/10 severity which lasted ~1 hour.    She denied any previous events like the above.    She recently URI infection for the past week with non-productive cough.    Of note, her biological son was diagnosed last year with Multiple Sclerosis. There is no history of MS or other autoimmune disease in any other family member.    Impression  Brittanie Tavares is a 52 year old female with no significant neurological or cardiovascular history who presents with 2 x episodes of transient " binocular visual changes followed by headache. The binocular symptoms suggest a central rather than opthalmologic etiology for these symptoms. The likelihood that these events represent a TIA are low given lack of cerebrovscular risk factors, total vision loss or other neurological deficits fitting a typical vascular territory. CTA is indicated however to rule-out potential stenosis particularly in the posterior circulation.     Based on the patient's descriptions, the pattern and characteristics of her visual symptoms appear reminiscent to migraine aura which is made more compelling by the headaches which follow each event. However, patient is older than would be expected for development of new migraines and has no past medical or family history of headaches. Additionally, these events do not fit the typical migraine presentation.    Multiple sclerosis was suggested by the isolated T2 hyperintensity in the frontal lobe on MRI and her son recently diagnosed with this disease. But this would not explain her symptoms and she had no history of other transient neurological deficits consistent with MS. Concern for temporal arteritis is low given the bilaterality of vision changes and headache characteristics.The very short transient nature of these events with no lingering deficits/symptoms likewise make other autoimmune causes unlikely.    Similarly, cardiovascular etiologies such as arrhythmia are unlikely given lack of dizziness, light-headedness, or palpitations.     Recommendations  - CTA Head & Neck w/contrast  - Check ESR  - If the above are negative, may discharge from ED & follow-up in Neurology Clinic for continued evaluation and management (orded by Neurology)    Patient Follow-up    - in 12-16 weeks with general neurology ([phone])    Thank you for this consult.     The patient was discussed with Stroke Fellow, Dr. Cabrera and General Neurology Staff, Dr. Bhatti.  The Stroke Staff is   "Christine.    Boom Mejia MD  Neurology Resident  Pager:  *[phone]  _____________________________________________________    Clinically Significant Risk Factors Present on Admission                       # Obesity: Estimated body mass index is 34.95 kg/m  as calculated from the following:    Height as of this encounter: 1.651 m (5' 5\").    Weight as of this encounter: 95.3 kg (210 lb).         Past Medical History   Past Medical History:   Diagnosis Date     Anemia     Eventually resolved following hysterectomy     Anesthesia complication     Slow to awaken     Fibroid     Resolved by hysterectomy     Fibroids      History of blood transfusion     Needed due to fibroids     Infertility      Sleep apnea     not formally diagnosed--was noted to have decreased O2 sats when sleeping      Varicella     Had when I was a child     Past Surgical History   Past Surgical History:   Procedure Laterality Date     ABDOMEN SURGERY  10-    C section     C/SECTION, CLASSICAL  1998     DILATE CERVIX, ABLATE ENDOMETRIUM HYDROTHERMAL, COMBINED N/A 2/17/2015    Procedure: COMBINED DILATE CERVIX, ABLATE ENDOMETRIUM HYDROTHERMAL;  Surgeon: Viviana Varela MD;  Location: [facility] OR     HYSTERECTOMY TOTAL ABDOMINAL N/A 3/13/2015    Procedure: HYSTERECTOMY TOTAL ABDOMINAL;  Surgeon: Viviana Varela MD;  Location: [facility] OR     hysteroscopic myomectomy  2010     SALPINGECTOMY Bilateral 3/13/2015    Procedure: SALPINGECTOMY;  Surgeon: Viviana Varela MD;  Location:  OR     [facility] TOTAL HIP ARTHROPLASTY      left     Medications   Home Meds  Prior to Admission medications    Medication Sig Start Date End Date Taking? Authorizing Provider   bacitracin 500 UNIT/GM external ointment Apply 1 Application topically as needed    Reported, Patient   desoximetasone (TOPICORT) 0.05 % GEL Apply twice a day to rash on scalp 3/17/22   Gianfranco Tierney MD   desoximetasone (TOPICORT) 0.25 % external cream Apply 1 Application topically    Reported, " Patient   ketoconazole (NIZORAL) 2 % external shampoo Massage into scalp, let sit 3-5 min, then rinse. Use 2-3 times per week  Patient not taking: Reported on 2021 10/6/20   Maribell Quiles MD   tacrolimus (PROTOPIC) 0.1 % external ointment Apply topically 2 times daily 3/17/22   Gianfranco Tierney MD       Scheduled Meds    sodium chloride 0.9%  1,000 mL Intravenous Once     acetaminophen  975 mg Oral Once     iopamidol (ISOVUE-370)  75 mL Intravenous Once     sodium chloride (PF)  90 mL Intravenous Once     triamcinolone acetonide  10 mg Intra-Lesional Once       Infusion Meds    sodium chloride           Allergies   No Known Allergies  Family History   Family History   Problem Relation Age of Onset     Breast Cancer Mother      Substance Abuse Father      Depression Father      Diabetes Father      Myocardial Infarction Maternal Grandmother      Coronary Artery Disease Maternal Grandmother      Hypertension Maternal Grandmother      Anxiety Disorder Brother      Mental Illness Brother      Substance Abuse Brother      Depression Brother      Diabetes No family hx of      Coronary Artery Disease No family hx of      Hypertension No family hx of      Melanoma No family hx of      Skin Cancer No family hx of      Social History   Social History     Tobacco Use     Smoking status: Former     Packs/day: 0.00     Years: 19.00     Pack years: 0.00     Types: Cigarettes     Start date: 3/31/1986     Quit date: 3/12/2005     Years since quittin.0     Smokeless tobacco: Never     Tobacco comments:     quit    Substance Use Topics     Alcohol use: Yes     Alcohol/week: 0.8 - 4.2 standard drinks     Comment: couple drinks per day on average     Drug use: No       Review of Systems   The 10 point Review of Systems is negative other than noted in the HPI or here.        PHYSICAL EXAMINATION   Temp:  [97.8  F (36.6  C)] 97.8  F (36.6  C)  Pulse:  [71] 71  Resp:  [16] 16  BP: (146)/(84) 146/84  SpO2:  [99 %]  99 %    Neurologic  Mental Status:  alert, oriented x 3, follows commands, speech clear and fluent, naming and repetition normal  Cranial Nerves:  visual fields intact, PERRL, EOMI with normal smooth pursuit, facial sensation intact and symmetric, facial movements symmetric, hearing not formally tested but intact to conversation, palate elevation symmetric and uvula midline, no dysarthria, shoulder shrug strong bilaterally, tongue protrusion midline  Motor:  normal muscle tone and bulk, no abnormal movements, able to move all limbs spontaneously, strength 5/5 throughout upper and lower extremities, no pronator drift  Reflexes:  2+ and symmetric throughout, no clonus, toes down-going  Sensory:  light touch sensation intact and symmetric throughout upper and lower extremities, no extinction on double simultaneous stimulation   Coordination:  normal finger-to-nose and heel-to-shin bilaterally without dysmetria, rapid alternating movements symmetric  Station/Gait:  normal width, turn, arm swing      Imaging  I personally reviewed all imaging; relevant findings per HPI.    MR BRAIN W/O CONTRAST  3/8/2023 7:52 PM   IMPRESSION:  1. No acute intracranial pathology.  2. No focal lesion or structural abnormality to explain the patient's  symptoms.    Labs Data   CBC  Recent Labs   Lab 03/08/23  1909   WBC 7.1   RBC 4.92   HGB 13.8   HCT 42.0        Basic Metabolic Panel   Recent Labs   Lab 03/08/23  1909      POTASSIUM 4.0   CHLORIDE 103   CO2 26   BUN 11.4   CR 0.65   GLC 93   FLORINA 9.5     Liver Panel  Recent Labs   Lab 03/08/23  1909   PROTTOTAL 6.9   ALBUMIN 4.5   BILITOTAL 0.3   ALKPHOS 97   AST 18   ALT 19     INR    Recent Labs   Lab Test 03/08/23  1909 02/05/15  1145   INR 0.96 1.05      Lipid Profile  No lab results found.  A1C  No lab results found.  Troponin  No results for input(s): CTROPT, TROPONINIS, TROPONINI, GHTROP in the last 168 hours.       Stroke Consult Data Data   This was a non-emergent,  non-telestroke consult.

## 2023-03-09 NOTE — ED PROVIDER NOTES
[place] EMERGENCY DEPARTMENT ([facility])    3/08/23       ED PROVIDER NOTE        History     Chief Complaint   Patient presents with     vision change     HPI  Brittanie Tavares is a 52 year old female with no significant past medical history who presents with 2 distinct episodes of binocular visual changes over the past 5 days. Last episode occurred approximately 4 hours prior to arrival and lasted for 30 minutes. Patient describes it as right lateral visual field blurriness or floaters. Patient was concerned that she had a retinal detachment so came into the emergency department. Of note, patient tested herself at the time of the symptoms and the symptoms were binocular. Subsequently, patient developed a mild headache. No speech or swallowing changes. No sensation or strength deficits. Patient is asymptomatic at time of presentation.     This part of the medical record was transcribed by Zhanna Brewster Medical Scribe, from a dictation done by Bladimir Price MD.     Past Medical History  Past Medical History:   Diagnosis Date     Anemia     Eventually resolved following hysterectomy     Anesthesia complication     Slow to awaken     Fibroid     Resolved by hysterectomy     Fibroids      History of blood transfusion     Needed due to fibroids     Infertility      Sleep apnea     not formally diagnosed--was noted to have decreased O2 sats when sleeping      Varicella     Had when I was a child     Past Surgical History:   Procedure Laterality Date     ABDOMEN SURGERY  10-    C section     C/SECTION, CLASSICAL  1998     DILATE CERVIX, ABLATE ENDOMETRIUM HYDROTHERMAL, COMBINED N/A 2/17/2015    Procedure: COMBINED DILATE CERVIX, ABLATE ENDOMETRIUM HYDROTHERMAL;  Surgeon: Viviana Varela MD;  Location: [facility] OR     HYSTERECTOMY TOTAL ABDOMINAL N/A 3/13/2015    Procedure: HYSTERECTOMY TOTAL ABDOMINAL;  Surgeon: Viviana Varela MD;  Location: [facility] OR     hysteroscopic myomectomy  2010      "SALPINGECTOMY Bilateral 3/13/2015    Procedure: SALPINGECTOMY;  Surgeon: Viviana Varela MD;  Location:  OR     [facility] TOTAL HIP ARTHROPLASTY      left     bacitracin 500 UNIT/GM external ointment  desoximetasone (TOPICORT) 0.05 % GEL  desoximetasone (TOPICORT) 0.25 % external cream  ketoconazole (NIZORAL) 2 % external shampoo  tacrolimus (PROTOPIC) 0.1 % external ointment      No Known Allergies  Family History  Family History   Problem Relation Age of Onset     Breast Cancer Mother      Substance Abuse Father      Depression Father      Diabetes Father      Myocardial Infarction Maternal Grandmother      Coronary Artery Disease Maternal Grandmother      Hypertension Maternal Grandmother      Anxiety Disorder Brother      Mental Illness Brother      Substance Abuse Brother      Depression Brother      Diabetes No family hx of      Coronary Artery Disease No family hx of      Hypertension No family hx of      Melanoma No family hx of      Skin Cancer No family hx of      Social History   Social History     Tobacco Use     Smoking status: Former     Packs/day: 0.00     Years: 19.00     Pack years: 0.00     Types: Cigarettes     Start date: 3/31/1986     Quit date: 3/12/2005     Years since quittin.0     Smokeless tobacco: Never     Tobacco comments:     quit    Substance Use Topics     Alcohol use: Yes     Alcohol/week: 0.8 - 4.2 standard drinks     Comment: couple drinks per day on average     Drug use: No      Past medical history, past surgical history, medications, allergies, family history, and social history were reviewed with the patient. No additional pertinent items.      A medically appropriate review of systems was performed with pertinent positives and negatives noted in the HPI, and all other systems negative.    Physical Exam   BP: (!) 146/84  Pulse: 71  Temp: 97.8  F (36.6  C)  Resp: 16  Height: 165.1 cm (5' 5\")  Weight: 95.3 kg (210 lb)  SpO2: 99 %  Physical Exam  Visual fields " binocularly in 4 quadrants. Pupils equal and reactive to light bilaterally. Equal ocular motion intact. Overall appearing well. Cranial nerves grossly intact. Strength and sensation on upper and lower extremity intact. No facial droop.    ED Course, Procedures, & Data      Procedures                     Results for orders placed or performed during the hospital encounter of 03/08/23   MR Brain w/o Contrast     Status: None    Narrative    EXAM: MR BRAIN W/O CONTRAST  3/8/2023 7:52 PM     HISTORY:  intermittent vision changes - binocular       COMPARISON:  None    TECHNIQUE: Sagittal T1-weighted, coronal T2-weighted, axial T2 FLAIR,  axial susceptibility weighted, and axial diffusion-weighted with ADC  map images of the brain were obtained without intravenous contrast    CONTRAST: None.    FINDINGS:    There is no mass effect, midline shift, or intracranial hemorrhage.  The ventricles are proportionate to the cerebral sulci. Diffusion and  susceptibility weighted images are negative for acute/focal  abnormality. Major intracranial vascular structures are within normal  limits. There is a single small T2 hyperintensity located in the right  frontal deep white matter.    No suspicious abnormality of the skull marrow signal. Clear paranasal  sinuses. Mastoid air cells are clear. No focal abnormality of the  pituitary gland, sella, skull base and upper cervical spinal  structures on sagittal images. The orbits are normal.      Impression    IMPRESSION:  1. No acute intracranial pathology.  2. No focal lesion or structural abnormality to explain the patient's  symptoms.    I have personally reviewed the examination and initial interpretation  and I agree with the findings.    LANG EDGAR MD         SYSTEM ID:  [id number]   Comprehensive metabolic panel     Status: Normal   Result Value Ref Range    Sodium 141 136 - 145 mmol/L    Potassium 4.0 3.4 - 5.3 mmol/L    Chloride 103 98 - 107 mmol/L    Carbon Dioxide (CO2) 26  22 - 29 mmol/L    Anion Gap 12 7 - 15 mmol/L    Urea Nitrogen 11.4 6.0 - 20.0 mg/dL    Creatinine 0.65 0.51 - 0.95 mg/dL    Calcium 9.5 8.6 - 10.0 mg/dL    Glucose 93 70 - 99 mg/dL    Alkaline Phosphatase 97 35 - 104 U/L    AST 18 10 - 35 U/L    ALT 19 10 - 35 U/L    Protein Total 6.9 6.4 - 8.3 g/dL    Albumin 4.5 3.5 - 5.2 g/dL    Bilirubin Total 0.3 <=1.2 mg/dL    GFR Estimate >90 >60 mL/min/1.73m2   INR     Status: Normal   Result Value Ref Range    INR 0.96 0.85 - 1.15   Partial thromboplastin time     Status: Normal   Result Value Ref Range    aPTT 28 22 - 38 Seconds   CBC with platelets and differential     Status: None   Result Value Ref Range    WBC Count 7.1 4.0 - 11.0 10e3/uL    RBC Count 4.92 3.80 - 5.20 10e6/uL    Hemoglobin 13.8 11.7 - 15.7 g/dL    Hematocrit 42.0 35.0 - 47.0 %    MCV 85 78 - 100 fL    MCH 28.0 26.5 - 33.0 pg    MCHC 32.9 31.5 - 36.5 g/dL    RDW 13.2 10.0 - 15.0 %    Platelet Count 256 150 - 450 10e3/uL    % Neutrophils 59 %    % Lymphocytes 29 %    % Monocytes 7 %    % Eosinophils 4 %    % Basophils 1 %    % Immature Granulocytes 0 %    NRBCs per 100 WBC 0 <1 /100    Absolute Neutrophils 4.2 1.6 - 8.3 10e3/uL    Absolute Lymphocytes 2.1 0.8 - 5.3 10e3/uL    Absolute Monocytes 0.5 0.0 - 1.3 10e3/uL    Absolute Eosinophils 0.3 0.0 - 0.7 10e3/uL    Absolute Basophils 0.1 0.0 - 0.2 10e3/uL    Absolute Immature Granulocytes 0.0 <=0.4 10e3/uL    Absolute NRBCs 0.0 10e3/uL   CBC with platelets differential     Status: None    Narrative    The following orders were created for panel order CBC with platelets differential.  Procedure                               Abnormality         Status                     ---------                               -----------         ------                     CBC with platelets and d...[[id number]]                      Final result                 Please view results for these tests on the individual orders.     Medications   0.9% sodium chloride BOLUS (has no  administration in time range)     Followed by   sodium chloride 0.9% infusion (has no administration in time range)   acetaminophen (TYLENOL) tablet 975 mg (has no administration in time range)     Labs Ordered and Resulted from Time of ED Arrival to Time of ED Departure   COMPREHENSIVE METABOLIC PANEL - Normal       Result Value    Sodium 141      Potassium 4.0      Chloride 103      Carbon Dioxide (CO2) 26      Anion Gap 12      Urea Nitrogen 11.4      Creatinine 0.65      Calcium 9.5      Glucose 93      Alkaline Phosphatase 97      AST 18      ALT 19      Protein Total 6.9      Albumin 4.5      Bilirubin Total 0.3      GFR Estimate >90     INR - Normal    INR 0.96     PARTIAL THROMBOPLASTIN TIME - Normal    aPTT 28     CBC WITH PLATELETS AND DIFFERENTIAL    WBC Count 7.1      RBC Count 4.92      Hemoglobin 13.8      Hematocrit 42.0      MCV 85      MCH 28.0      MCHC 32.9      RDW 13.2      Platelet Count 256      % Neutrophils 59      % Lymphocytes 29      % Monocytes 7      % Eosinophils 4      % Basophils 1      % Immature Granulocytes 0      NRBCs per 100 WBC 0      Absolute Neutrophils 4.2      Absolute Lymphocytes 2.1      Absolute Monocytes 0.5      Absolute Eosinophils 0.3      Absolute Basophils 0.1      Absolute Immature Granulocytes 0.0      Absolute NRBCs 0.0       MR Brain w/o Contrast   Final Result   IMPRESSION:   1. No acute intracranial pathology.   2. No focal lesion or structural abnormality to explain the patient's   symptoms.      I have personally reviewed the examination and initial interpretation   and I agree with the findings.      LANG EDGAR MD            SYSTEM ID:  [id number]             Critical care was not performed.     Medical Decision Making  The patient's presentation was of high complexity (an acute health issue posing potential threat to life or bodily function).    The patient's evaluation involved:  ordering and/or review of 2 test(s) in this encounter (see separate  area of note for details)  discussion of management or test interpretation with another health professional (see separate area of note for details)    The patient's management necessitated high risk (a decision regarding hospitalization).      Assessment & Plan    Symptoms are potentially consistent with a migraine type symptoms. Differential diagnosis includes TIA. Likely to be primary ophthalmologic in etiology, given that these symptoms have been binocular twice. Given no symptoms at current time and NIH stroke scale of 0, will not activate acute stroke but will rule out subacute stroke. MRI of brain, performed neurology consult, preoperative labs, and give oral pain medication and IV fluids for potential migraine.    10pm  MRI brain unremarkable.  Neurology consult is evaluating patient.  Labs overall unremarkable   Pending neuro eval, will likely discharge with neuro and ophtho outpatient visits      I have reviewed the nursing notes. I have reviewed the findings, diagnosis, plan and need for follow up with the patient.    New Prescriptions    No medications on file       Final diagnoses:   None   IZhanna, am serving as a trained medical scribe to document services personally performed by Bladimir Price MD, based on the provider's statements to me.     IBladimir MD, was physically present and have reviewed and verified the accuracy of this note documented by Zhanna Brewster.      Bladimir Price MD   [facility] EMERGENCY DEPARTMENT  3/8/2023     Bladimir Price MD  03/08/23 3902

## 2023-03-09 NOTE — TELEPHONE ENCOUNTER
Spoke to pt at 1723    Subjective visual disturbance/blurry peripheral vision in right eye and possibly left eye yesterday that resolved after 30 minutes    Vision still at base line today    Pt seen in ED and stroke work up done yesterday    Scheduled exam tomorrow with Dr. Cook     Pt aware of date/time/locaiton/duration at [facility]    Homero Wetzel RN 5:36 PM 03/09/23    --        Brittanie Tavares [phone]     Left message on home/cell at 1331    Homero Wetzel RN 1:32 PM 03/09/23             Health Call Center    Phone Message    May a detailed message be left on voicemail: yes     Reason for Call: Appointment Intake      Referring Provider Name: ER    Diagnosis and/or Symptoms: right lateral visual field blurriness or floaters. Patient was advised to follow up with eye clinic. Please call to schedule.    Action Taken: Other: eye    Travel Screening: Not Applicable

## 2023-03-10 ENCOUNTER — OFFICE VISIT (OUTPATIENT)
Dept: OPHTHALMOLOGY | Facility: CLINIC | Age: 53
End: 2023-03-10
Attending: OPTOMETRIST
Payer: COMMERCIAL

## 2023-03-10 DIAGNOSIS — H53.453 LOSS OF PERIPHERAL VISUAL FIELD, BILATERAL: Primary | ICD-10-CM

## 2023-03-10 DIAGNOSIS — G43.109 MIGRAINE AURA OCCURRING WITH AND WITHOUT HEADACHE: ICD-10-CM

## 2023-03-10 PROCEDURE — 92083 EXTENDED VISUAL FIELD XM: CPT | Performed by: OPTOMETRIST

## 2023-03-10 PROCEDURE — G0463 HOSPITAL OUTPT CLINIC VISIT: HCPCS | Mod: 25

## 2023-03-10 PROCEDURE — 92002 INTRM OPH EXAM NEW PATIENT: CPT | Performed by: OPTOMETRIST

## 2023-03-10 ASSESSMENT — TONOMETRY
IOP_METHOD: TONOPEN
OD_IOP_MMHG: 16
OS_IOP_MMHG: 13

## 2023-03-10 ASSESSMENT — CUP TO DISC RATIO
OS_RATIO: 0.2
OD_RATIO: 0.25

## 2023-03-10 ASSESSMENT — CONF VISUAL FIELD
OD_INFERIOR_TEMPORAL_RESTRICTION: 0
OS_INFERIOR_NASAL_RESTRICTION: 0
OS_SUPERIOR_NASAL_RESTRICTION: 0
OS_SUPERIOR_TEMPORAL_RESTRICTION: 0
OD_SUPERIOR_TEMPORAL_RESTRICTION: 0
METHOD: COUNTING FINGERS
OD_INFERIOR_NASAL_RESTRICTION: 0
OD_NORMAL: 1
OS_INFERIOR_TEMPORAL_RESTRICTION: 0
OD_SUPERIOR_NASAL_RESTRICTION: 0
OS_NORMAL: 1

## 2023-03-10 ASSESSMENT — VISUAL ACUITY
OD_SC: 20/25
METHOD: SNELLEN - LINEAR
OD_SC+: +2
OS_SC+: -1
OS_SC: 20/20

## 2023-03-10 ASSESSMENT — EXTERNAL EXAM - RIGHT EYE: OD_EXAM: NORMAL

## 2023-03-10 ASSESSMENT — SLIT LAMP EXAM - LIDS
COMMENTS: NORMAL
COMMENTS: NORMAL

## 2023-03-10 ASSESSMENT — EXTERNAL EXAM - LEFT EYE: OS_EXAM: NORMAL

## 2023-03-10 NOTE — PROGRESS NOTES
Assessment & Plan       Brittanie Tavares is a 52 year old female with the following diagnoses:   1. Loss of peripheral visual field, bilateral - Both Eyes    2. Migraine aura occurring with and without headache - Both Eyes          visual fields test normal each eye  Ocular health normal each eye   Migraines consult PCP       Patient disposition:   No follow-ups on file.          Complete documentation of historical and exam elements from today's encounter can be found in the full encounter summary report (not reduplicated in this progress note). I personally obtained the chief complaint(s) and history of present illness.  I confirmed and edited as necessary the review of systems, past medical/surgical history, family history, social history, and examination findings as documented by others; and I examined the patient myself. I personally reviewed the relevant tests, images, and reports as documented above. I formulated and edited as necessary the assessment and plan and discussed the findings and management plan with the patient and family.  Dr. Trevor Cook

## 2023-05-08 NOTE — PROGRESS NOTES
ESTABLISHED PATIENT NEUROLOGY NOTE    DATE OF VISIT: 5/9/2023  CLINIC LOCATION: [facility] AMA  MRN: [id number]  PATIENT NAME: Brittanie Tavares  YOB: 1970    REASON FOR VISIT: No chief complaint on file.    SUBJECTIVE:                                                      HISTORY OF PRESENT ILLNESS: Patient is new to me, but was previously seen by stroke neurology team.  History and prior evaluation are briefly summarized below.  Please refer to previous neurology notes for more detailed information.    Per chart review, the patient has history of psoriasis, anemia, and fibroids.  On 3/8/2023 she presented to [facility] with 2 episodes of transient monocular visual changes followed by headache.  Was evaluated by inpatient stroke neurology team who felt that presentation possibly consistent with migraine aura.    Per patient's report, she developed intermittent episodes of reduced peripheral vision that lasted for about 30 minutes followed by mild headache.  She denies any additional focal neurological symptoms.  She was evaluated by optometrist, who did not find any ocular abnormalities.    Laboratory evaluation from March 2023 includes unremarkable CMP, elevated CRP of 8.99, normal CBC, sed rate (9), and INR/PTT.    Brain MRI without contrast from 3/8/2023 was negative for acute intracranial pathology.  Head CT from the same date was normal.  Head and neck CTA were normal.  All images were personally reviewed and independently interpreted.    No additional useful information is available in Care Everywhere, which was reviewed.  EXAM:                                                    Physical Exam:   Vitals: LMP 03/13/2015     General: pt is in NAD, cooperative.  Skin: normal turgor, moist mucous membranes, no lesions/rashes noticed.  HEENT: ATNC, white sclera, normal conjunctiva.  Respiratory: Symmetric lung excursion, no accessory  respiratory muscle use.  Abdomen: Non distended.  Neurological: awake, cooperative, follows commands, no aphasia or dysarthria noted, cranial nerves II-XII: no ptosis, extraocular motility is full, face is symmetric, tongue is midline, equally moves all extremities, strength is 5/5 bilaterally in upper and lower extremities, no pronator drift, deep tendon reflexes are equal bilaterally, sensation to the light touch, vibration, and pinprick is intact bilaterally, finger to nose and heel-knee-shin tests are intact bilaterally, casual gait is normal.  ASSESSMENT AND PLAN:                                                    Assessment: 53 year old female patient presents for a follow-up of ***.    Diagnoses:  No diagnosis found.  Plan:  There are no Patient Instructions on file for this visit.    Total Time: *** minutes spent on the date of the encounter doing chart review, history and exam, documentation and further activities per the note.    Gadiel Castañeda MD  [facility] Neurology  (Chart documentation was completed in part with Dragon voice-recognition software. Even though reviewed, some grammatical, spelling, and word errors may remain.)

## 2023-05-09 ENCOUNTER — OFFICE VISIT (OUTPATIENT)
Dept: NEUROLOGY | Facility: CLINIC | Age: 53
End: 2023-05-09
Payer: COMMERCIAL

## 2023-05-09 VITALS — OXYGEN SATURATION: 96 % | HEART RATE: 72 BPM | DIASTOLIC BLOOD PRESSURE: 88 MMHG | SYSTOLIC BLOOD PRESSURE: 127 MMHG

## 2023-05-09 DIAGNOSIS — H53.9 VISION CHANGES: Primary | ICD-10-CM

## 2023-05-09 PROCEDURE — 99215 OFFICE O/P EST HI 40 MIN: CPT | Performed by: PSYCHIATRY & NEUROLOGY

## 2023-05-09 NOTE — PATIENT INSTRUCTIONS
AFTER VISIT SUMMARY (AVS):    At today's visit we thoroughly discussed current symptoms, evaluation results, diagnostic possibilities, and the plan.    We decided to monitor your symptoms for recurrence.    Next follow-up appointment is on as needed basis.    Please do not hesitate to call me with any questions or concerns.    Thanks.

## 2023-05-09 NOTE — PROGRESS NOTES
ESTABLISHED PATIENT NEUROLOGY NOTE    DATE OF VISIT: 5/9/2023  CLINIC LOCATION: [facility] AMA  MRN: [id number]  PATIENT NAME: Brittanie Tavares  YOB: 1970    REASON FOR VISIT:   Chief Complaint   Patient presents with     Consult     Vision changes     SUBJECTIVE:                                                      HISTORY OF PRESENT ILLNESS: Patient is new to me, but was previously seen by stroke neurology team.  History and prior evaluation are briefly summarized below.  Please refer to previous neurology notes for more detailed information.    Per chart review, the patient has history of psoriasis, anemia, and fibroids.  On 3/8/2023 she presented to [facility] with 2 episodes of transient visual changes followed by mild headache.  First episode happened during [employer] concert when she experienced tunnel/blurry vision for about 20 minutes.  Next week (prior to her presentation to ED) she experienced loss of peripheral vision on the right side with wavy lines/blurriness while working on the computer.  It lasted approximately 20 to 30 minutes.  Both times it followed by mild headaches that she experienced for a while almost daily, but eventually it subsided.      Was evaluated by inpatient stroke neurology team who felt that presentation possibly consistent with migraine aura.    Today, she denies recurrence of her symptoms, as well as any additional focal neurological symptoms.  She was evaluated by optometrist, who did not find any ocular abnormalities.  No prior history of head injuries, seizures, or CNS infections.    Laboratory evaluation from March 2023 includes unremarkable CMP, elevated CRP of 8.99, normal CBC, sed rate (9), and INR/PTT.    Brain MRI without contrast from 3/8/2023 was negative for acute intracranial pathology.  Head CT from the same date was normal.  Head and neck CTA were normal.  All images were personally reviewed  and independently interpreted.    No additional useful information is available in Care Everywhere, which was reviewed.  EXAM:                                                    Physical Exam:   Vitals: /88 (BP Location: Left arm, Patient Position: Sitting, Cuff Size: Adult Regular)   Pulse 72   LMP 03/13/2015   SpO2 96%     General: pt is in NAD, cooperative.  Skin: normal turgor, moist mucous membranes, no lesions/rashes noticed.  HEENT: ATNC, white sclera, normal conjunctiva.  Respiratory: Symmetric lung excursion, no accessory respiratory muscle use.  Abdomen: Non distended.  Neurological: awake, cooperative, follows commands, no aphasia or dysarthria noted, cranial nerves II-XII: no ptosis, funduscopic exam is unremarkable bilaterally, extraocular motility is full, face is symmetric, tongue is midline, equally moves all extremities, strength is 5/5 bilaterally in upper and lower extremities, no pronator drift, deep tendon reflexes are equal bilaterally, sensation to the light touch, vibration, and pinprick is intact bilaterally, finger to nose and heel-knee-shin tests are intact bilaterally, casual/tandem gait is normal.  ASSESSMENT AND PLAN:                                                    Assessment: 53 year old female patient presents for a follow-up of her emergency room visit related to transient visual changes in March 2023 without recurrence to date.  We discussed that the clinical presentation would be most likely consistent with migraine aura, but additional, though much less likely, differential for the second episode includes TIA.  We decided to monitor her symptoms for recurrence.  In such case, we might consider migraine prevention medication (a supplement or possibly verapamil) if presentation at that time would be suggestive of migraine phenomenon.    Diagnoses:    ICD-10-CM    1. Vision changes  H53.9 Adult Neurology  Referral        Plan: At today's visit we thoroughly  discussed current symptoms, evaluation results, diagnostic possibilities, and the plan.    We decided to monitor patient's symptoms for recurrence.    Next follow-up appointment is on as needed basis.    Total Time: 43 minutes spent on the date of the encounter doing chart review, history and exam, documentation and further activities per the note.    Gadiel Castañeda MD  [facility] Neurology  (Chart documentation was completed in part with Dragon voice-recognition software. Even though reviewed, some grammatical, spelling, and word errors may remain.)

## 2023-05-09 NOTE — PROGRESS NOTES
"Brittanie Tavares is a 53 year old female who presents for:  Chief Complaint   Patient presents with     Consult     Vision changes        Initial Vitals:  /88 (BP Location: Left arm, Patient Position: Sitting, Cuff Size: Adult Regular)   Pulse 72   LMP 03/13/2015   SpO2 96%  Estimated body mass index is 34.95 kg/m  as calculated from the following:    Height as of 3/8/23: 1.651 m (5' 5\").    Weight as of 3/8/23: 95.3 kg (210 lb).. There is no height or weight on file to calculate BSA. BP completed using cuff size: ynes Farooq  "

## 2023-05-09 NOTE — LETTER
5/9/2023         RE: Brittanie Tavares  [address] [facility] [address]        Dear Colleague,    Thank you for referring your patient, Brittanie Tavares, to the [facility] NEUROLOGY CLINICS [facility]. Please see a copy of my visit note below.    ESTABLISHED PATIENT NEUROLOGY NOTE    DATE OF VISIT: 5/9/2023  CLINIC LOCATION: [facility]  MRN: [id number]  PATIENT NAME: Brittanie Tavares  YOB: 1970    REASON FOR VISIT:   Chief Complaint   Patient presents with     Consult     Vision changes     SUBJECTIVE:                                                      HISTORY OF PRESENT ILLNESS: Patient is new to me, but was previously seen by stroke neurology team.  History and prior evaluation are briefly summarized below.  Please refer to previous neurology notes for more detailed information.    Per chart review, the patient has history of psoriasis, anemia, and fibroids.  On 3/8/2023 she presented to [facility] with 2 episodes of transient visual changes followed by mild headache.  First episode happened during [employer] concert when she experienced tunnel/blurry vision for about 20 minutes.  Next week (prior to her presentation to ED) she experienced loss of peripheral vision on the right side with wavy lines/blurriness while working on the computer.  It lasted approximately 20 to 30 minutes.  Both times it followed by mild headaches that she experienced for a while almost daily, but eventually it subsided.      Was evaluated by inpatient stroke neurology team who felt that presentation possibly consistent with migraine aura.    Today, she denies recurrence of her symptoms, as well as any additional focal neurological symptoms.  She was evaluated by optometrist, who did not find any ocular abnormalities.  No prior history of head injuries, seizures, or CNS infections.    Laboratory evaluation from March 2023 includes unremarkable  CMP, elevated CRP of 8.99, normal CBC, sed rate (9), and INR/PTT.    Brain MRI without contrast from 3/8/2023 was negative for acute intracranial pathology.  Head CT from the same date was normal.  Head and neck CTA were normal.  All images were personally reviewed and independently interpreted.    No additional useful information is available in Care Everywhere, which was reviewed.  EXAM:                                                    Physical Exam:   Vitals: /88 (BP Location: Left arm, Patient Position: Sitting, Cuff Size: Adult Regular)   Pulse 72   LMP 03/13/2015   SpO2 96%     General: pt is in NAD, cooperative.  Skin: normal turgor, moist mucous membranes, no lesions/rashes noticed.  HEENT: ATNC, white sclera, normal conjunctiva.  Respiratory: Symmetric lung excursion, no accessory respiratory muscle use.  Abdomen: Non distended.  Neurological: awake, cooperative, follows commands, no aphasia or dysarthria noted, cranial nerves II-XII: no ptosis, funduscopic exam is unremarkable bilaterally, extraocular motility is full, face is symmetric, tongue is midline, equally moves all extremities, strength is 5/5 bilaterally in upper and lower extremities, no pronator drift, deep tendon reflexes are equal bilaterally, sensation to the light touch, vibration, and pinprick is intact bilaterally, finger to nose and heel-knee-shin tests are intact bilaterally, casual/tandem gait is normal.  ASSESSMENT AND PLAN:                                                    Assessment: 53 year old female patient presents for a follow-up of her emergency room visit related to transient visual changes in March 2023 without recurrence to date.  We discussed that the clinical presentation would be most likely consistent with migraine aura, but additional, though much less likely, differential for the second episode includes TIA.  We decided to monitor her symptoms for recurrence.  In such case, we might consider migraine  "prevention medication (a supplement or possibly verapamil) if presentation at that time would be suggestive of migraine phenomenon.    Diagnoses:    ICD-10-CM    1. Vision changes  H53.9 Adult Neurology  Referral        Plan: At today's visit we thoroughly discussed current symptoms, evaluation results, diagnostic possibilities, and the plan.    We decided to monitor patient's symptoms for recurrence.    Next follow-up appointment is on as needed basis.    Total Time: 43 minutes spent on the date of the encounter doing chart review, history and exam, documentation and further activities per the note.    Gadiel Castañeda MD  [facility] Neurology  (Chart documentation was completed in part with Dragon voice-recognition software. Even though reviewed, some grammatical, spelling, and word errors may remain.)     Brittanie Tavares is a 53 year old female who presents for:  Chief Complaint   Patient presents with     Consult     Vision changes        Initial Vitals:  /88 (BP Location: Left arm, Patient Position: Sitting, Cuff Size: Adult Regular)   Pulse 72   LMP 03/13/2015   SpO2 96%  Estimated body mass index is 34.95 kg/m  as calculated from the following:    Height as of 3/8/23: 1.651 m (5' 5\").    Weight as of 3/8/23: 95.3 kg (210 lb).. There is no height or weight on file to calculate BSA. BP completed using cuff size: ynes Farooq      Again, thank you for allowing me to participate in the care of your patient.        Sincerely,        Gadiel Castañeda MD    "

## 2023-09-25 NOTE — PROGRESS NOTES
[facility] Dermatology Note  Encounter Date: Sep 26, 2023  Seen last in Dermatology: 03/17/2022    Dermatology Problem List:  # Perioral dermatitis/seborrheic dermatitis   # Sebopsoriasis occipital scalp   Current treatments: clobetasol shampoo, protopic (for face), desoximetasone  ____________________________________________    Assessment & Plan:   # Perioral dermatitis/seborrheic dermatitis   Continues to have waxing waning course, and rash typically responds to Protopic twice daily use when flaring.  Barriers to medication adherence at this time include the taste and feel of Protopic, which she notes gets in her mouth despite using Vaseline barrier.  Encouraged that it is safe to use Protopic on a consistent basis as this is a steroid sparing topical.  Advised to use twice weekly as maintenance to prevent future flares.  Alternatively, can use plain Vaseline several times throughout the week to encourage skin hydration.  - Continue protopic.    # Sebopsoriasis occipital scalp   Chronic with occasional flare.  Responded well to IL K previously but unfortunately had recurrence.  Currently using desoximetasone gel 1-2 times weekly as needed which helps.  Advised that it is safe to use this medication on a consistent basis to the scalp without risk of side effects of topical corticosteroid such as skin thinning.  Again recommended twice weekly use for maintenance when dermatitis resolved to prevent recurrent flares. We will start clobetasol shampoo to the scalp to help control inflammation, as well as order a handheld UVB device for treatment of this specific area.   - Continue desoximetasone gel   - Start clobetasol shampoo. Good Rx information included in AVS if insurance doesn't cover.   - Start UVB therapy to isolated area of inflammation.     Home UVB phototherapy unit  Unit type: wand  Diagnosis: severe psoriasis, other  Greater than 3% BSA involvement.  Areas of involvement:  scalp  Caputo skin type: II    Procedures Performed:   None    Follow-up: 6 months, prn for new or changing lesions    Staff and Resident:     Lora Russell MD  PGY-4, Internal Medicine-Dermatology  Pager: *[phone]     Staff: Dr. Tierney    I have seen and examined this patient and agree with the assessment and plan as documented in the resident's note.    Gianfranco Tierney MD  Dermatology Attending    ____________________________________________    CC: Derm Problem (Brittanie is here today for a dermatitis follow up )    HPI:  Ms. Brittanie Tavares is a(n) 53 year old female who presents today as a return patient for scalp and face concerns.    Patient notes that the back of her scalp is flaring today.  Consistent with her past note, is using desoximetasone occasionally to this area with partial improvement, feels that the area is very active today.  Symptoms include itching and sensitivity.  She has tried using ketoconazole shampoos in the past, however she thought that these were not particularly helpful and did not like that the shampoos did not lather.  She is using a tea tree shampoo which she likes.    Patient notes that her face does respond to the treatment prescribed, however on discontinuation of the treatment, the rash reoccurs.  Verbalizes frustration that she appears to cycle with a rash at least weekly, and if its not her face that is flaring, it is her scalp.    She was last seen in March of 2022 at which time she was continued on Protopic for her Perioral dermatitis/seborrheic dermatitis, and desoximetasone gel for her occipital scalp sebopsoriasis       Labs Reviewed:  N/A    Physical Exam:  Vitals: LMP 03/13/2015   SKIN: Focused examination of face and scalp was performed.  - Face clear of dermatitis  - At the occiput there is a coin shaped area of erythematous plaque with micaceous adherent scale present.   - No other lesions of concern on areas examined.     Medications:  Current Outpatient  Medications   Medication     bacitracin 500 UNIT/GM external ointment     clobetasol propionate (CLOBEX) 0.05 % external shampoo     desoximetasone (TOPICORT) 0.05 % GEL     desoximetasone (TOPICORT) 0.25 % external cream     ketoconazole (NIZORAL) 2 % external shampoo     tacrolimus (PROTOPIC) 0.1 % external ointment     Current Facility-Administered Medications   Medication     triamcinolone acetonide (KENALOG-10) injection 10 mg      Past Medical History:   Patient Active Problem List   Diagnosis     Menorrhagia     Fibroids, intramural     Dermatitis, seborrheic     Lip fissure     Psoriasis     Perioral dermatitis     Past Medical History:   Diagnosis Date     Anemia     Eventually resolved following hysterectomy     Anesthesia complication     Slow to awaken     Fibroid     Resolved by hysterectomy     Fibroids      History of blood transfusion     Needed due to fibroids     Infertility      Sleep apnea     not formally diagnosed--was noted to have decreased O2 sats when sleeping      Varicella     Had when I was a child       CC Referred Self, MD  No address on file on close of this encounter.

## 2023-09-26 ENCOUNTER — OFFICE VISIT (OUTPATIENT)
Dept: DERMATOLOGY | Facility: CLINIC | Age: 53
End: 2023-09-26
Payer: COMMERCIAL

## 2023-09-26 DIAGNOSIS — L71.0 PERIORAL DERMATITIS: ICD-10-CM

## 2023-09-26 DIAGNOSIS — L40.9 PSORIASIS: Primary | ICD-10-CM

## 2023-09-26 DIAGNOSIS — L21.9 DERMATITIS, SEBORRHEIC: ICD-10-CM

## 2023-09-26 PROCEDURE — 99214 OFFICE O/P EST MOD 30 MIN: CPT | Mod: GC | Performed by: DERMATOLOGY

## 2023-09-26 RX ORDER — CLOBETASOL PROPIONATE 0.05 G/100ML
SHAMPOO TOPICAL
Qty: 118 ML | Refills: 3 | Status: SHIPPED | OUTPATIENT
Start: 2023-09-26

## 2023-09-26 ASSESSMENT — PAIN SCALES - GENERAL: PAINLEVEL: NO PAIN (0)

## 2023-09-26 NOTE — LETTER
9/26/2023       RE: Brittanie Tavares  [address] [facility] [address]     Dear Colleague,    Thank you for referring your patient, Brittanie Tavares, to the [facility] DERMATOLOGY CLINIC [place] [facility]. Please see a copy of my visit note below.    [facility] Dermatology Note  Encounter Date: Sep 26, 2023  Seen last in Dermatology: 03/17/2022    Dermatology Problem List:  # Perioral dermatitis/seborrheic dermatitis   # Sebopsoriasis occipital scalp   Current treatments: clobetasol shampoo, protopic (for face), desoximetasone  ____________________________________________    Assessment & Plan:   # Perioral dermatitis/seborrheic dermatitis   Continues to have waxing waning course, and rash typically responds to Protopic twice daily use when flaring.  Barriers to medication adherence at this time include the taste and feel of Protopic, which she notes gets in her mouth despite using Vaseline barrier.  Encouraged that it is safe to use Protopic on a consistent basis as this is a steroid sparing topical.  Advised to use twice weekly as maintenance to prevent future flares.  Alternatively, can use plain Vaseline several times throughout the week to encourage skin hydration.  - Continue protopic.    # Sebopsoriasis occipital scalp   Chronic with occasional flare.  Responded well to IL K previously but unfortunately had recurrence.  Currently using desoximetasone gel 1-2 times weekly as needed which helps.  Advised that it is safe to use this medication on a consistent basis to the scalp without risk of side effects of topical corticosteroid such as skin thinning.  Again recommended twice weekly use for maintenance when dermatitis resolved to prevent recurrent flares. We will start clobetasol shampoo to the scalp to help control inflammation, as well as order a handheld UVB device for treatment of this specific area.   - Continue  desoximetasone gel   - Start clobetasol shampoo. Good Rx information included in AVS if insurance doesn't cover.   - Start UVB therapy to isolated area of inflammation.     Home UVB phototherapy unit  Unit type: wand  Diagnosis: severe psoriasis, other  Greater than 3% BSA involvement.  Areas of involvement: scalp  Caputo skin type: II    Procedures Performed:   None    Follow-up: 6 months, prn for new or changing lesions    Staff and Resident:     Lora Russell MD  PGY-4, Internal Medicine-Dermatology  Pager: *[phone]     Staff: Dr. Tierney    ____________________________________________    CC: Derm Problem (Brittanie is here today for a dermatitis follow up )    HPI:  Ms. Brittanie Tavares is a(n) 53 year old female who presents today as a return patient for scalp and face concerns.    Patient notes that the back of her scalp is flaring today.  Consistent with her past note, is using desoximetasone occasionally to this area with partial improvement, feels that the area is very active today.  Symptoms include itching and sensitivity.  She has tried using ketoconazole shampoos in the past, however she thought that these were not particularly helpful and did not like that the shampoos did not lather.  She is using a tea tree shampoo which she likes.    Patient notes that her face does respond to the treatment prescribed, however on discontinuation of the treatment, the rash reoccurs.  Verbalizes frustration that she appears to cycle with a rash at least weekly, and if its not her face that is flaring, it is her scalp.    She was last seen in March of 2022 at which time she was continued on Protopic for her Perioral dermatitis/seborrheic dermatitis, and desoximetasone gel for her occipital scalp sebopsoriasis       Labs Reviewed:  N/A    Physical Exam:  Vitals: LMP 03/13/2015   SKIN: Focused examination of face and scalp was performed.  - Face clear of dermatitis  - At the occiput there is a coin shaped area of  erythematous plaque with micaceous adherent scale present.   - No other lesions of concern on areas examined.     Medications:  Current Outpatient Medications   Medication    bacitracin 500 UNIT/GM external ointment    clobetasol propionate (CLOBEX) 0.05 % external shampoo    desoximetasone (TOPICORT) 0.05 % GEL    desoximetasone (TOPICORT) 0.25 % external cream    ketoconazole (NIZORAL) 2 % external shampoo    tacrolimus (PROTOPIC) 0.1 % external ointment     Current Facility-Administered Medications   Medication    triamcinolone acetonide (KENALOG-10) injection 10 mg      Past Medical History:   Patient Active Problem List   Diagnosis    Menorrhagia    Fibroids, intramural    Dermatitis, seborrheic    Lip fissure    Psoriasis    Perioral dermatitis     Past Medical History:   Diagnosis Date    Anemia     Eventually resolved following hysterectomy    Anesthesia complication     Slow to awaken    Fibroid     Resolved by hysterectomy    Fibroids     History of blood transfusion     Needed due to fibroids    Infertility     Sleep apnea     not formally diagnosed--was noted to have decreased O2 sats when sleeping     Varicella     Had when I was a child       CC Referred Self, MD  No address on file on close of this encounter.

## 2023-09-26 NOTE — NURSING NOTE
Dermatology Rooming Note    Brittanie Tavares's goals for this visit include:   Chief Complaint   Patient presents with    Derm Problem     Brittanie is here today for a dermatitis follow up      LAVELLE Thibodeaux

## 2023-09-26 NOTE — PATIENT INSTRUCTIONS
IN SUMMARY:   -New clobetasol (steroid) shampoo prescription     We're sending a prescription for clobetasol shampoo which we recommend you use on your scalp at least 1-2 times weekly. If you're insurance doesn't cover it, there's a coupon website for prescriptions called [employer] where you can get it more affordably. Prices should be around 30-50 dollars.     Options for escalation include a handheld UVB unit that you would apply to the area directly.

## 2023-10-17 ENCOUNTER — IMMUNIZATION (OUTPATIENT)
Dept: NURSING | Facility: CLINIC | Age: 53
End: 2023-10-17
Payer: COMMERCIAL

## 2023-10-17 PROCEDURE — 91320 SARSCV2 VAC 30MCG TRS-SUC IM: CPT

## 2023-10-17 PROCEDURE — 90480 ADMN SARSCOV2 VAC 1/ONLY CMP: CPT

## 2023-11-05 ENCOUNTER — HEALTH MAINTENANCE LETTER (OUTPATIENT)
Age: 53
End: 2023-11-05

## 2024-01-14 ENCOUNTER — HEALTH MAINTENANCE LETTER (OUTPATIENT)
Age: 54
End: 2024-01-14

## 2024-07-12 ENCOUNTER — TRANSCRIBE ORDERS (OUTPATIENT)
Dept: OTHER | Age: 54
End: 2024-07-12

## 2024-07-12 DIAGNOSIS — J32.0 CHRONIC MAXILLARY SINUSITIS: Primary | ICD-10-CM

## 2024-09-26 NOTE — TELEPHONE ENCOUNTER
FUTURE VISIT INFORMATION      FUTURE VISIT INFORMATION:  Date: 11/19/24  Time: 9:40AM  Location: [facility]  REFERRAL INFORMATION:  Referring provider:    Referring providers clinic:    Reason for visit/diagnosis  Per pt- fluid in ear. [facility] verified     RECORDS REQUESTED FROM:       Clinic name Comments Records Status Imaging Status

## 2024-11-14 DIAGNOSIS — Z01.10 ENCOUNTER FOR HEARING EXAMINATION: Primary | ICD-10-CM

## 2024-11-19 ENCOUNTER — PRE VISIT (OUTPATIENT)
Dept: OTOLARYNGOLOGY | Facility: CLINIC | Age: 54
End: 2024-11-19

## 2024-11-19 ENCOUNTER — OFFICE VISIT (OUTPATIENT)
Dept: AUDIOLOGY | Facility: CLINIC | Age: 54
End: 2024-11-19
Attending: REGISTERED NURSE
Payer: COMMERCIAL

## 2024-11-19 ENCOUNTER — OFFICE VISIT (OUTPATIENT)
Dept: OTOLARYNGOLOGY | Facility: CLINIC | Age: 54
End: 2024-11-19
Attending: REGISTERED NURSE
Payer: COMMERCIAL

## 2024-11-19 VITALS
TEMPERATURE: 96.5 F | BODY MASS INDEX: 33.95 KG/M2 | SYSTOLIC BLOOD PRESSURE: 131 MMHG | DIASTOLIC BLOOD PRESSURE: 85 MMHG | HEART RATE: 77 BPM | WEIGHT: 204 LBS

## 2024-11-19 DIAGNOSIS — G25.3 STAPEDIAL MYOCLONUS: ICD-10-CM

## 2024-11-19 DIAGNOSIS — H93.13 TINNITUS, BILATERAL: Primary | ICD-10-CM

## 2024-11-19 DIAGNOSIS — H93.11 TINNITUS OF RIGHT EAR: Primary | ICD-10-CM

## 2024-11-19 DIAGNOSIS — Z01.10 ENCOUNTER FOR HEARING EXAMINATION: ICD-10-CM

## 2024-11-19 ASSESSMENT — PAIN SCALES - GENERAL: PAINLEVEL_OUTOF10: NO PAIN (0)

## 2024-11-19 NOTE — NURSING NOTE
Chief Complaint   Patient presents with    Consult     Fluid in ear      Blood pressure 131/85, pulse 77, temperature (!) 96.5  F (35.8  C), weight 92.5 kg (204 lb), last menstrual period 03/13/2015, not currently breastfeeding.Jason Warner LPN

## 2024-11-19 NOTE — PROGRESS NOTES
AUDIOLOGY REPORT    SUMMARY: Audiology visit completed. See audiogram for results.      RECOMMENDATIONS: Follow-up with ENT.    Sharon Montes, [facility]  Licensed Audiologist  [place] #[id number]

## 2024-11-19 NOTE — LETTER
11/19/2024       RE: Brittanie Tavares  [address] [facility] [address]     Dear Colleague,    Thank you for referring your patient, Brittanie Tavares, to the [facility] EAR NOSE AND THROAT CLINIC [place] at [facility]. Please see a copy of my visit note below.      Otolaryngology Clinic  November 19, 2024    Chief Complaint:   Right ear symptoms       History of Present Illness:   Brittanie Tavares is a 54 year old female who presents today for evaluation of intermittent right ear fullness and tinnitus.  Patient reports intermittent symptoms over the past 2 years.  Reports that, when they are exposed to certain noises including loud concerts, dishes and music in the car, the right ear crackles and hearing can become distorted.  There is no dizziness when the symptoms are present.  Symptoms abruptly stop went sound is not present.  No symptoms in the left ear.  Patient denies any hearing loss between episodes. Patient denies any otalgia, otorrhea, dizziness, facial numbness/weakness, history of frequent ear infections, or ear surgeries.  Does report a history of right shoulder surgery that has caused some trapezius and neck tension.  Denies any jaw joint inflammation or pain.  Intermittent stress but patient has not had an increased amount of stress over the past couple of years.  Patient states that right ear symptoms have slightly improved over the past 6 months.    Past Medical History:  Past Medical History:   Diagnosis Date     Anemia     Eventually resolved following hysterectomy     Anesthesia complication     Slow to awaken     Fibroid     Resolved by hysterectomy     Fibroids      History of blood transfusion     Needed due to fibroids     Infertility      Sleep apnea     not formally diagnosed--was noted to have decreased O2 sats when sleeping      Varicella     Had when I was a child       Past Surgical History:  Past Surgical History:    Procedure Laterality Date     ABDOMEN SURGERY  1998    C section     C/SECTION, CLASSICAL       DILATE CERVIX, ABLATE ENDOMETRIUM HYDROTHERMAL, COMBINED N/A 2015    Procedure: COMBINED DILATE CERVIX, ABLATE ENDOMETRIUM HYDROTHERMAL;  Surgeon: Viviana Varela MD;  Location: [facility] OR     HYSTERECTOMY TOTAL ABDOMINAL N/A 3/13/2015    Procedure: HYSTERECTOMY TOTAL ABDOMINAL;  Surgeon: Viviana Varela MD;  Location: [facility] OR     hysteroscopic myomectomy       SALPINGECTOMY Bilateral 3/13/2015    Procedure: SALPINGECTOMY;  Surgeon: Viviana Varela MD;  Location: [facility] OR     ZZC TOTAL HIP ARTHROPLASTY      left       Medications:  Current Outpatient Medications   Medication Sig Dispense Refill     bacitracin 500 UNIT/GM external ointment Apply 1 Application topically as needed       clobetasol propionate (CLOBEX) 0.05 % external shampoo Use at inflamed areas on the scalp at least twice weekly. 118 mL 3     desoximetasone (TOPICORT) 0.05 % GEL Apply twice a day to rash on scalp 60 g 3     desoximetasone (TOPICORT) 0.25 % external cream Apply 1 Application topically       ketoconazole (NIZORAL) 2 % external shampoo Massage into scalp, let sit 3-5 min, then rinse. Use 2-3 times per week 120 mL 5     tacrolimus (PROTOPIC) 0.1 % external ointment Apply topically 2 times daily 60 g 2       Allergies:  No Known Allergies     Social History:  Social History     Tobacco Use     Smoking status: Former     Current packs/day: 0.00     Types: Cigarettes     Start date: 3/31/1986     Quit date: 3/12/2005     Years since quittin.7     Smokeless tobacco: Never     Tobacco comments:     quit    Substance Use Topics     Alcohol use: Yes     Alcohol/week: 0.8 - 4.2 standard drinks of alcohol     Comment: couple drinks per day on average     Drug use: No       ROS: 10 point ROS neg other than the symptoms noted above in the HPI.    Physical Exam:    LMP 2015      Constitutional:  The patient was  unaccompanied, well-groomed, and in no acute distress.     Skin: Normal:  warm and pink without rash    Neurologic: Alert and oriented x 3.  CN's III-XII within normal limits.  Voice normal.    Psychiatric: The patient's affect was calm, cooperative, and appropriate.     Communication:  Normal; communicates verbally, normal voice quality.    Respiratory: Breathing comfortably without stridor or exertion of accessory muscles.    Ears: Pinnae and tragus non-tender.  EAC's and TM's were clear.       Audiogram: 11/19/2024 - data independently reviewed  Normal hearing bilaterally  % at 50 dB bilaterally  Acoustic Reflexes: Absent in right ipsi and left contra.  Present in all other conditions  Tympanograms: type A bilaterally    Assessment and Plan:  1. 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 Middle ear myoclonus occurs with spasm of the very small muscles behind the eardrum and in front of the cochlea. Can typically be induced by stress or tension.  Reassured patient that these symptoms do no cause damage to the ear or hearing. Recommend relaxation, good sleep hygiene and muscle tension reduction.     Discussed other treatment options including muscle relaxant or surgical intervention. If symptoms do no improve with conservative relaxation interventions, patient should contact clinic for further management strategies.  Patient will follow-up with dentist to assess for any jaw muscle tension that may be contributing to symptoms.  Discussed use of earplugs which patient can continue if they find that to be helpful with managing symptoms.     Patient will follow up as needed if symptoms worsen or fail to improve with management strategies discussed above.    Etta Self DNP, APRN, CNP  Otolaryngology  Head & Neck Surgery  [phone]    30 minutes spent by me on the date of the encounter doing chart review, history and exam, documentation and further activities per the  note      Again, thank you for allowing me to participate in the care of your patient.      Sincerely,    Josette Self NP

## 2024-11-19 NOTE — PROGRESS NOTES
Otolaryngology Clinic  November 19, 2024    Chief Complaint:   Right ear symptoms       History of Present Illness:   Brittanie Tavares is a 54 year old female who presents today for evaluation of intermittent right ear fullness and tinnitus.  Patient reports intermittent symptoms over the past 2 years.  Reports that, when they are exposed to certain noises including loud concerts, dishes and music in the car, the right ear crackles and hearing can become distorted.  There is no dizziness when the symptoms are present.  Symptoms abruptly stop went sound is not present.  No symptoms in the left ear.  Patient denies any hearing loss between episodes. Patient denies any otalgia, otorrhea, dizziness, facial numbness/weakness, history of frequent ear infections, or ear surgeries.  Does report a history of right shoulder surgery that has caused some trapezius and neck tension.  Denies any jaw joint inflammation or pain.  Intermittent stress but patient has not had an increased amount of stress over the past couple of years.  Patient states that right ear symptoms have slightly improved over the past 6 months.    Past Medical History:  Past Medical History:   Diagnosis Date    Anemia     Eventually resolved following hysterectomy    Anesthesia complication     Slow to awaken    Fibroid     Resolved by hysterectomy    Fibroids     History of blood transfusion     Needed due to fibroids    Infertility     Sleep apnea     not formally diagnosed--was noted to have decreased O2 sats when sleeping     Varicella     Had when I was a child       Past Surgical History:  Past Surgical History:   Procedure Laterality Date    ABDOMEN SURGERY  10-    C section    C/SECTION, CLASSICAL  1998    DILATE CERVIX, ABLATE ENDOMETRIUM HYDROTHERMAL, COMBINED N/A 2/17/2015    Procedure: COMBINED DILATE CERVIX, ABLATE ENDOMETRIUM HYDROTHERMAL;  Surgeon: Viviana Varela MD;  [facility]    HYSTERECTOMY TOTAL ABDOMINAL N/A 3/13/2015     Procedure: HYSTERECTOMY TOTAL ABDOMINAL;  Surgeon: Viviana Varela MD;  [facility]    hysteroscopic myomectomy  2010    SALPINGECTOMY Bilateral 3/13/2015    Procedure: SALPINGECTOMY;  Surgeon: Viviana Varela MD;  [facility]    [facility] TOTAL HIP ARTHROPLASTY      left       Medications:  Current Outpatient Medications   Medication Sig Dispense Refill    bacitracin 500 UNIT/GM external ointment Apply 1 Application topically as needed      clobetasol propionate (CLOBEX) 0.05 % external shampoo Use at inflamed areas on the scalp at least twice weekly. 118 mL 3    desoximetasone (TOPICORT) 0.05 % GEL Apply twice a day to rash on scalp 60 g 3    desoximetasone (TOPICORT) 0.25 % external cream Apply 1 Application topically      ketoconazole (NIZORAL) 2 % external shampoo Massage into scalp, let sit 3-5 min, then rinse. Use 2-3 times per week 120 mL 5    tacrolimus (PROTOPIC) 0.1 % external ointment Apply topically 2 times daily 60 g 2       Allergies:  No Known Allergies     Social History:  Social History     Tobacco Use    Smoking status: Former     Current packs/day: 0.00     Types: Cigarettes     Start date: 3/31/1986     Quit date: 3/12/2005     Years since quittin.7    Smokeless tobacco: Never    Tobacco comments:     quit    Substance Use Topics    Alcohol use: Yes     Alcohol/week: 0.8 - 4.2 standard drinks of alcohol     Comment: couple drinks per day on average    Drug use: No       ROS: 10 point ROS neg other than the symptoms noted above in the HPI.    Physical Exam:    LMP 2015      Constitutional:  The patient was unaccompanied, well-groomed, and in no acute distress.     Skin: Normal:  warm and pink without rash    Neurologic: Alert and oriented x 3.  CN's III-XII within normal limits.  Voice normal.    Psychiatric: The patient's affect was calm, cooperative, and appropriate.     Communication:  Normal; communicates verbally, normal voice quality.    Respiratory: Breathing  comfortably without stridor or exertion of accessory muscles.    Ears: Pinnae and tragus non-tender.  EAC's and TM's were clear.       Audiogram: 11/19/2024 - data independently reviewed  Normal hearing bilaterally  % at 50 dB bilaterally  Acoustic Reflexes: Absent in right ipsi and left contra.  Present in all other conditions  Tympanograms: type A bilaterally    Assessment and Plan:  1. 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 Middle ear myoclonus occurs with spasm of the very small muscles behind the eardrum and in front of the cochlea. Can typically be induced by stress or tension.  Reassured patient that these symptoms do no cause damage to the ear or hearing. Recommend relaxation, good sleep hygiene and muscle tension reduction.     Discussed other treatment options including muscle relaxant or surgical intervention. If symptoms do no improve with conservative relaxation interventions, patient should contact clinic for further management strategies.  Patient will follow-up with dentist to assess for any jaw muscle tension that may be contributing to symptoms.  Discussed use of earplugs which patient can continue if they find that to be helpful with managing symptoms.     Patient will follow up as needed if symptoms worsen or fail to improve with management strategies discussed above.    Etta Self DNP, APRN, CNP  Otolaryngology  Head & Neck Surgery  [phone]    30 minutes spent by me on the date of the encounter doing chart review, history and exam, documentation and further activities per the note

## 2025-02-06 ENCOUNTER — OFFICE VISIT (OUTPATIENT)
Dept: AUDIOLOGY | Facility: CLINIC | Age: 55
End: 2025-02-06
Payer: COMMERCIAL

## 2025-02-06 DIAGNOSIS — H90.3 ASYMMETRICAL SENSORINEURAL HEARING LOSS: Primary | ICD-10-CM

## 2025-02-06 DIAGNOSIS — H90.41 SENSORINEURAL HEARING LOSS (SNHL) OF RIGHT EAR WITH UNRESTRICTED HEARING OF LEFT EAR: Primary | ICD-10-CM

## 2025-02-06 RX ORDER — PREDNISONE 10 MG/1
TABLET ORAL
Qty: 75 TABLET | Refills: 0 | Status: SHIPPED | OUTPATIENT
Start: 2025-02-06 | End: 2025-02-21

## 2025-02-06 NOTE — PROGRESS NOTES
AUDIOLOGY REPORT    SUMMARY: Audiology visit completed. See audiogram for results.      RECOMMENDATIONS: Follow-up with ENT.      Oc Edwards  Audiologist  [place] License  #[id number]

## 2025-03-11 ENCOUNTER — OFFICE VISIT (OUTPATIENT)
Dept: DERMATOLOGY | Facility: CLINIC | Age: 55
End: 2025-03-11
Payer: COMMERCIAL

## 2025-03-11 DIAGNOSIS — L21.9 DERMATITIS, SEBORRHEIC: ICD-10-CM

## 2025-03-11 DIAGNOSIS — L71.0 PERIORAL DERMATITIS: Primary | ICD-10-CM

## 2025-03-11 PROCEDURE — 1126F AMNT PAIN NOTED NONE PRSNT: CPT | Performed by: DERMATOLOGY

## 2025-03-11 PROCEDURE — 99213 OFFICE O/P EST LOW 20 MIN: CPT | Performed by: DERMATOLOGY

## 2025-03-11 RX ORDER — TACROLIMUS 1 MG/G
OINTMENT TOPICAL 2 TIMES DAILY
Qty: 60 G | Refills: 2 | Status: SHIPPED | OUTPATIENT
Start: 2025-03-11

## 2025-03-11 ASSESSMENT — PAIN SCALES - GENERAL: PAINLEVEL_OUTOF10: NO PAIN (0)

## 2025-03-11 NOTE — LETTER
3/11/2025       RE: Brittanie Tavares  [address] [facility] [address]     Dear Colleague,    Thank you for referring your patient, Brittanie Tavares, to the [facility] DERMATOLOGY [facility] [place] at [facility]. Please see a copy of my visit note below.    [facility] Dermatology Note  Encounter Date: Mar 11, 2025  Office Visit   Seen last in Dermatology: 09/26/2023    Dermatology Problem List:  1. # Perioral dermatitis/seborrheic dermatitis   # Sebopsoriasis occipital scalp   Current treatments: clobetasol shampoo, protopic (for face), desoximetasone    ____________________________________________    Assessment & Plan:    # Perioral dermatitis/seborrheic dermatitis.  Chronic active problem.  Patient continues to have waxing and waning course and  feels that Protropic has helped with flares. Advised that it is safe to use Protropic around her eyes and in her nostrils.   -Continue protropic 0.1 ointment bid    Staff and Medical Student:     Vicki Melissa, MS2  Seen and staffed with Dr. Tierney    I was present with the medical student who participated in the service and in the documentation.  I have verified the history and personally performed the physical exam and medical decision making.  I agree with the assessment and plan of care as documented in the note.     Gianfranco Tierney MD  Dermatology Attending  ____________________________________________    CC: Derm Problem (Perioral dermatitis/seborrheic dermatitis: using tacrolimus ointment/Reports flaring on elbows)    HPI:  Ms. Brittanie Tavares is a(n) 54 year old female who presents today as a return patient for seborrheic dermatitis    She reports flares on the inside of her left eyelid and in her nostrils over the past 3 weeks. She has been using Protropic with improvement and feels the flares are less active today. She presents to discuss concerns about the safety of using this  medication in these areas. She reports improvement to her scalp dermatitis after using clobetasol shampoo with no flares in the past year.     Patient is otherwise feeling well, without additional skin concerns.    Labs:      Physical Exam:  Vitals: LMP 03/13/2015   SKIN: Focused examination of face and right elbow was performed.  - Face: 1 mm area of scale on left medial canthus and left upper lip with minimal erythema.   - Right elbow there is a 1 cm erythematous plaque with scale  - No other lesions of concern on areas examined.     Medications:  Current Outpatient Medications   Medication Sig Dispense Refill     tacrolimus (PROTOPIC) 0.1 % external ointment Apply topically 2 times daily 60 g 2     bacitracin 500 UNIT/GM external ointment Apply 1 Application topically as needed (Patient not taking: Reported on 3/11/2025)       clobetasol propionate (CLOBEX) 0.05 % external shampoo Use at inflamed areas on the scalp at least twice weekly. (Patient not taking: Reported on 3/11/2025) 118 mL 3     desoximetasone (TOPICORT) 0.05 % GEL Apply twice a day to rash on scalp (Patient not taking: Reported on 3/11/2025) 60 g 3     desoximetasone (TOPICORT) 0.25 % external cream Apply 1 Application topically (Patient not taking: Reported on 3/11/2025)       ketoconazole (NIZORAL) 2 % external shampoo Massage into scalp, let sit 3-5 min, then rinse. Use 2-3 times per week (Patient not taking: Reported on 3/11/2025) 120 mL 5     Current Facility-Administered Medications   Medication Dose Route Frequency Provider Last Rate Last Admin     triamcinolone acetonide (KENALOG-10) injection 10 mg  10 mg Intra-Lesional Once Gianfranco Tierney MD          Past Medical History:   Patient Active Problem List   Diagnosis     Menorrhagia     Fibroids, intramural     Dermatitis, seborrheic     Lip fissure     Psoriasis     Perioral dermatitis     Past Medical History:   Diagnosis Date     Anemia     Eventually resolved following hysterectomy      Anesthesia complication     Slow to awaken     Fibroid     Resolved by hysterectomy     Fibroids      History of blood transfusion     Needed due to fibroids     Infertility      Sleep apnea     not formally diagnosed--was noted to have decreased O2 sats when sleeping      Varicella     Had when I was a child        CC Gianfranco Tierney MD  [address] [place] [address] [id number]  [place] [address] on close of this encounter.       Again, thank you for allowing me to participate in the care of your patient.      Sincerely,    Gianfranco Tierney MD

## 2025-03-11 NOTE — NURSING NOTE
Dermatology Rooming Note    Brittanie Tavares's goals for this visit include:   Chief Complaint   Patient presents with    Derm Problem     Perioral dermatitis/seborrheic dermatitis: using tacrolimus ointment     Sangita Dhaliwal LPN

## 2025-03-12 ENCOUNTER — TELEPHONE (OUTPATIENT)
Dept: DERMATOLOGY | Facility: CLINIC | Age: 55
End: 2025-03-12
Payer: COMMERCIAL

## 2025-03-12 NOTE — TELEPHONE ENCOUNTER
3/12 Patient confirmed scheduled appointment:  Date: 3/24/26  Time: 12:15PM  Visit type: Return Dermatology  Provider: Niall  Location: [facility]  Testing/imaging: na  Additional notes: na    
Intact

## 2025-04-02 ENCOUNTER — OFFICE VISIT (OUTPATIENT)
Dept: PODIATRY | Facility: CLINIC | Age: 55
End: 2025-04-02
Payer: COMMERCIAL

## 2025-04-02 VITALS — BODY MASS INDEX: 33.95 KG/M2 | WEIGHT: 204 LBS

## 2025-04-02 DIAGNOSIS — M20.12 HALLUX ABDUCTOVALGUS, ACQUIRED, LEFT: ICD-10-CM

## 2025-04-02 DIAGNOSIS — M21.622 TAILOR'S BUNION OF BOTH FEET: ICD-10-CM

## 2025-04-02 DIAGNOSIS — M20.11 HALLUX ABDUCTOVALGUS, ACQUIRED, RIGHT: Primary | ICD-10-CM

## 2025-04-02 DIAGNOSIS — L84 CALLUS OF FOOT: ICD-10-CM

## 2025-04-02 DIAGNOSIS — M21.621 TAILOR'S BUNION OF BOTH FEET: ICD-10-CM

## 2025-04-02 PROCEDURE — 99203 OFFICE O/P NEW LOW 30 MIN: CPT | Performed by: PODIATRIST

## 2025-04-02 NOTE — LETTER
4/2/2025      Brittanie Tavares  [address] [facility] [address]      Dear Colleague,    Thank you for referring your patient, Brittanie Tavares, to the [facility]. Please see a copy of my visit note below.    ASSESSMENT:  Encounter Diagnoses   Name Primary?     Hallux abductovalgus, acquired, right Yes     Hallux abductovalgus, acquired, left      Tailor's bunion of both feet      Callus of foot      MEDICAL DECISION MAKING:  The hyperkeratotic lesion is more consistent with a nucleated callus and then verrucoid lesion.  We discussed the difference between the two.  I pared down some of the hyperkeratotic tissue to visualize nucleation rather than the cauliflower appearance of a wart.  I explained that this is the skin is reaction to a high pressure point.  Recommendations:  Self filing or trimming  Stiffer soled shoes  Cushioned insert with aperture  Metatarsal type padding    We discussed the cause and nature of both medial bunions and tailor bunions.  The relevant anatomy was discussed.  At this point I recommend the conservative recommendations, specifically shoes that accommodate her forefoot width.  Anti-inflammatory measures as needed  Good support.    We briefly discussed surgical options for bunions and tailor bunions.    Disclaimer: This note consists of symbols derived from keyboarding, dictation and/or voice recognition software. As a result, there may be errors in the script that have gone undetected. Please consider this when interpreting information found in this chart.    Raza Rodríguez, PARISH, FACFAS, [facility] Department of Podiatry/Foot & Ankle Surgery      ____________________________________________________________________    HPI:       Brittanie Tavares presents today concerned about having bunions.  She is some aching discomfort over the past 6 months.  This comes and goes and is rated a 5 out of 10 at worst  She specifies both the first and fifth  "metatarsal head region.  She also questions if she has \"pronation.  \"  She also questions if she has a plantar wart    Exercise activities include walking and biking    *  Past Medical History:   Diagnosis Date     Anemia     Eventually resolved following hysterectomy     Anesthesia complication     Slow to awaken     Fibroid     Resolved by hysterectomy     Fibroids      History of blood transfusion     Needed due to fibroids     Infertility      Sleep apnea     not formally diagnosed--was noted to have decreased O2 sats when sleeping      Varicella     Had when I was a child   *  *  Past Surgical History:   Procedure Laterality Date     ABDOMEN SURGERY  10-    C section     C/SECTION, CLASSICAL  1998     DILATE CERVIX, ABLATE ENDOMETRIUM HYDROTHERMAL, COMBINED N/A 2/17/2015    Procedure: COMBINED DILATE CERVIX, ABLATE ENDOMETRIUM HYDROTHERMAL;  Surgeon: Viviana Varela MD;  [facility]     HYSTERECTOMY TOTAL ABDOMINAL N/A 3/13/2015    Procedure: HYSTERECTOMY TOTAL ABDOMINAL;  Surgeon: Viviana Varela MD;  [facility]     hysteroscopic myomectomy  2010     SALPINGECTOMY Bilateral 3/13/2015    Procedure: SALPINGECTOMY;  Surgeon: Viviana Varela MD;  [facility]     [facility] TOTAL HIP ARTHROPLASTY      left   *  *  Current Outpatient Medications   Medication Sig Dispense Refill     bacitracin 500 UNIT/GM external ointment Apply 1 Application topically as needed (Patient not taking: Reported on 3/11/2025)       clobetasol propionate (CLOBEX) 0.05 % external shampoo Use at inflamed areas on the scalp at least twice weekly. (Patient not taking: Reported on 3/11/2025) 118 mL 3     desoximetasone (TOPICORT) 0.05 % GEL Apply twice a day to rash on scalp (Patient not taking: Reported on 3/11/2025) 60 g 3     desoximetasone (TOPICORT) 0.25 % external cream Apply 1 Application topically (Patient not taking: Reported on 3/11/2025)       ketoconazole (NIZORAL) 2 % external shampoo Massage into " scalp, let sit 3-5 min, then rinse. Use 2-3 times per week (Patient not taking: Reported on 3/11/2025) 120 mL 5     tacrolimus (PROTOPIC) 0.1 % external ointment Apply topically 2 times daily. 60 g 2         EXAM:    Vitals: LMP 03/13/2015   BMI: There is no height or weight on file to calculate BMI.    Vasc:      Pedal pulses are palpable for the dorsalis pedis posterior tibial artery, bilateral foot.  Capillary fill time </= 3 seconds  Pedal skin appears well-perfused  Neuro:      Light touch sensation intact to all sensory nerve distributions, bilateral foot.  No apparent spastic contractures or other deformity secondary to neurologic compromise.  Derm:      Nucleated hyperkeratotic lesion below the right fifth metatarsal head.  No wounds   No worrisome lesions  MSK:      Mild tailor's bunions bilateral.  Mild hallux abductovalgus with preserved sagittal plane range of motion and reducible in the transverse plane  Bilateral lower extremity muscle strength presents is normal.  No gross deformities  Adequate ankle and subtalar joint range of motion  Calf:    Neg for redness, swelling or tenderness        Again, thank you for allowing me to participate in the care of your patient.        Sincerely,        Raza Rodríguez DPM    Electronically signed

## 2025-04-02 NOTE — PATIENT INSTRUCTIONS
Thank you for choosing [facility]!    DR. LIVINGSTON'S CLINIC LOCATIONS:     [facility] TRIAGE LINE: [phone]   [address] APPOINTMENTS: [phone]   [place] [address] RADIOLOGY: [phone]   (Every other Tues - Wed - Fri PM) SET UP SURGERY: [phone]    PHYSICAL THERAPY: [phone]   [place] [facility] BILLING QUESTIONS: [phone] [address] FAX: [phone]   [place] [address]    (Thurs & Fri AM)       BUNION (HALLUX ABDUCTO VALGUS)  A bunion is caused by muscle imbalance. The great toe is pulled toward the smaller toes. The metatarsal head is pushed outward creating a lump on the side of your foot. Imbalance is the result of foot structure and instability.   Bunions do not improve with time. They usually enlarge, however this is a fairly slow process. Shoes do not necessarily cause bunions, however, they can hasten development and definitely cause bunions to hurt.   Bunions often run in families. We inherit a certain foot structure, which may be predisposed to bunion development.   Bunion pain is likely a combination of shoes rubbing on the bump, nerve irritation, compression between the toes, joint misalignment, arthritis and altered gait.   SYMPTOMS   Bunions are usually termed mild, moderate or severe. Just because you have a bunion does not mean you have to have pain. There are some people with very severe bunions and no pain and people with mild bunions and a lot of pain.   - Pain on the inside of your foot at the big toe joint (1st MTPJ)   - Swelling on the inside of your foot at the big toe joint   - Redness on the inside of your foot at the big toe joint   - Numbness or burning in the big toe (hallux)   - Decreased motion at the big toe joint   - Painful bursa (fluid-filled sac) on the inside of your foot at the big toe joint   - Pain while wearing shoes -especially shoes too narrow or with high heels    - Pain during  activities   - Corn in between the big toe and second toe   - Callous formation on the side or bottom of the big toe or big toe joint   - Callous under the second toe joint (2nd MTPJ)   - Pain in the second toe joint   TREATMENT  Conservative (non-surgical) treatment will not make the bunion go away, but it will hopefully decrease the signs and symptoms you have and help you get rid of the pain and get you back to your activities.   1.  Wider shoes or extra depth shoes: Most bunion pain can be improved simply by wearing compatible shoes. People with bunions cannot choose footwear simply because they like the style. Your bunion should determine which shoes are to be worn. Wide shoes with nonirritating seams,soft leather and a square toe box are most compatible with a bunion. Shoes should fit appropriately right out of the box but may need to be professionally stretched and modified to accommodate the bump. Heels, dress shoes and shoes with pointed toes will not be comfortable.   2. NSAIDs   3.  Arch supports, custom inserts, padding, splints, toe spacers : Most bunion pain can be improved simply by wearing compatible shoes. People with bunions cannot choose footwear simply because they like the style. Your bunion should determine which shoes are to be worn. Wide shoes with nonirritating seams,soft leather and a square toe box are most compatible with a bunion. Shoes should fit appropriately right out of the box but may need to be professionally stretched and modified to accommodate the bump. Heels, dress shoes and shoes with pointed toes will not be comfortable.   4.  Change activities   5.  Physical therapy  SURGERY  Surgical treatment for bunions is sometimes needed. If you are limited by pain, cannot fit in shoes comfortably and are not able to do your daily activities then surgery may be a good option for you. There are many different surgical procedures to repair bunions. Your foot and ankle surgeon will review  your foot exam findings, your x-rays, your age, your health, your lifestyle, your physical activity level and discuss with you which procedure he or she would recommend. Surgical procedures for bunions range from soft tissue repair to cutting and realigning the bones. It is not recommended that you have bunion surgery for cosmetic reasons (you do not like how your foot looks) or because you want to fit in a certain pair of shoes; There is the risk that even after surgery, the bunion will reoccur 9-10% of the time.   Bunion surgery involves cutting and repositioning the bones surrounding the bunion. Pins and screws are used to hold the bones in place during the healing process. The goal of bunion surgery is to reduce the size of the bunion bump. Realignment of the toe and joint is attempted.     Some first toes cannot be forced back into normal alignment even with surgery. Surgery is helpful in most cases but does not necessarily create a normal foot.   Healing after surgery requires about six weeks of protection. This allows the bone to heal. Maximum recovery takes about one year. The scar tissue and jOint structures require this amount of time to finish the healing process. Expect stiffness, swelling and numbness during that time frame. Bunion surgery does involve side effects. Some side effects are predictable and others are less common but do occur. A scar will be visible and could be irritated by shoes. The shoe may rub on the screw or internal pin requiring surgical removal of these fixation devices. The screw and pin would likely be left in place for a full year. The first toe may loose motion after bunion surgery. The amount of stiffness is variable. Some people never regain normal motion of the first toe. This is due to scar tissue inherent to any surgery. The first toe may drift toward the second toe or away from the second toe. Spreading of the first and second toes is a rare occurrence after bunion  surgery. This can be quite bothersome and would need to be surgically repaired. Toe drift toward the second toe could result in a recurrent bunion and revision surgery. Joint fusion is one option to correct an unstable, drifting toe. This procedure straightens the toe, however, no motion remains. Fusion may be necessary to correct complications of bunion surgery or as the original procedure in severe cases.   All surgical procedures involve risk of infection, numbness, pain, delayed healing, osteotomy dislocation, blood clots, continued foot pain, etc. Bunion surgery is quite complex and should not be taken lightly.   Any skin incision can lead to infection. Deep infection might involve the bone and thus repeat surgery and six weeks of IV antibiotics. Scar tissue can cause nerve pain or numbness. This is generally temporary but can be permanent. We do not have treatments that cure nerve problems. Second toe pain could be related to altered mechanics and pressure transferred to the second toe. Most feet with bunions have pre-existing second toe problems. Delayed bone healing would lengthen the healing time. Some bones simply do not heal. This requires repeat surgery, electronic bone stimulation and/or extended protection. Smokers have an approximate 20% chance of poor bone healing. This is double that of a non-smoker. The bone cut may displace. This may need to be repaired with a second operation. Displacement can cause jOint malalignment. Immobility after surgery can cause blood clots in the legs and lungs. This could result in death.   Foot pain is complex. Most feet hurt for more than one reason. Fixing the bunion would not necessarily create a pain free foot. Appropriate shoes, healthy body weight, avoidance of bare foot walking and moderation of activity will always be necessary to enjoy foot comfort. Your bunion may involve arthritis, which is incurable even with surgery. Long standing bunions often involve  chronic irritation to the surrounding nerves. Nerve pain may not resolve even with reducing the bunion bump since permanent nerve damage may be present   Bunion surgery is nevertheless quite successful. Most surgical patients are pleased with their foot following bunion surgery. Many of the issues described above can be controlled by taking proper care of your foot during the healing process.   Your surgeon would be happy to fully describe any of the above issues. You should pursue a full understanding of the operation,recovery process and any potential problems that could develop.   PREVENTION  1.  Do not wear high heels if there is a family history of bunions.  2.  Wear shoes that have enough width and depth in the toe box  Here are exercises that may benefit people with bunions:   Toe stretches - Stretching out your toes can help keep them limber and offset foot pain. To stretch your toes, point your toes straight ahead for 5 seconds and then curl them under for 5 seconds. Repeat these stretches 10 times. These exercises can be especially beneficial if you also have hammertoes, or chronically bent toes, in addition to a bunion.   Toe flexing and lorna - Press your toes against a hard surface such as a wall, to flex and stretch them; hold the position for 10 seconds and repeat three to four times. Then flex your toes in the opposite direction; hold the position for 10 seconds and repeat three to four times.   Stretching your big toe - Using your fingers to gently pull your big toe over into proper alignment can be helpful as well. Hold your toe in position for 10 seconds and repeat three to four times.   Resistance exercises - Wrap either a towel or belt around your big toe and use it to pull your big toe toward you while simultaneously pushing forward, against the towel, with your big toe.   Ball roll - To massage the bottom of your foot, sit down, place a golf ball on the floor under your foot, and roll it  around under your foot for two minutes. This can help relieve foot strain and cramping.   Towel curls - You can strengthen your toes by spreading out a small towel on the floor, curling your toes around it, and pulling it toward you. Repeat five times. Gripping objects with your toes like this can help keep your foot flexible.   Picking up marbles - Another gripping exercise you can perform to keep your foot flexible is picking up marbles with your toes. Do this by placing 20 marbles on the floor in front of you and use your foot to pick the marbles up one by one and place them in a bowl.   Walking along the beach - Whenever possible, spend time walking on sand. This can give you a gentle foot massage and also help strengthen your toes. This is especially beneficial for people who have arthritis associated with their bunions.    TAILOR'S BUNION   A tailor's bunion is a bump at the base of the fifth toe. The bump is a prominence in the bone. The overlying skin, nerves, and other soft tissues become irritated as your shoe rubs on the bump. The bump may periodically look larger depending on the degree of local inflammation. You may see redness in the area due to shoe pressure.   A sensory nerve lies directly over the bump. Numbness, burning pain, and tingling may develop due to pressure on the nerve. Long-term nerve irritation may lead to permanent nerve damage.   Most shoes are not shaped to accommodate a lump in this area of the foot. Shoes with stitched seams, rubber ridges and inflexible material will cause more irritation. Pointed dress shoes are definitely problematic. Avoidance of poor fitting shoes is the first line of treatment.   Clinical examination and weight bearing x-rays are all it takes to diagnose this condition. Other treatment options include injection, professional stretching of the shoes, pads, and surgery.   Tailor's bunion surgery involves removing part of the bone in order to reduce the bump.  Oftentimes, a bone cut or osteotomy is performed which allows the surgeon to realign your bone. Pins or screws will be used to stabilize the bone cut during the healing process.   Surgery is very effective but you will still need to be careful with shoe fit. A surgical incision is made in close proximity to the above mentioned Deny. The scar may be sensitive for this reason. The fifth metatarsal bone is quite narrow and fragile. Surgical correction is somewhat limited by the local anatomy and shape of the bone. Pin or screw removal may be necessary at some point after the surgical site heals.      DR. LIVINGSTON'S CALLUS/ CORN TREATMENT RECOMMENDATIONS    Please realize that most calluses come from your foot structure, the way you walk and the resulting stress on the skin. They cannot be frozen, burned, or surgically removed. They will come back. It is the skin responding to stress.    1) Use a foot cream to moisturize the callus and any dry skin on your feet.    2) Try to file the callus down with a pumice stone.  Any trimming with a sharp tool is at your own risk. If you injure yourself or are concerned about an infection, please return to clinic.    3) Try wearing shoes that have a rigid/ stiffer sole.  If they don't bend much when you walk, you won't but as much pressure on calluses that are in the ball-of-foot area.    4) Consider purchasing a cushioned insert and cutting a hole below the callus.    5) Another option for calluses under the ball of of the foot is to purchase a felt metatarsal pad. Hapad is a good brand. These can be found online.    6) Calluses on toes are called corns.  These also can be filed down and pads include donut pads, toe sleeves, tube foam, toe spacers.    7) You can return periodically for trimming of the corn or callus, but please check with your insurance to know if you have coverage or if you will have to pay out-of-pocket. [facility] will require you sign a notification waiver,  stating that your insurance will be billed for the service and you are responsible for payment if it is not covered.    CALLUS / CORNS / IPKs  When there is excessive friction or pressure on the skin, the body responds by making the skin thicker to protect the deeper structures from becoming exposed. While this works well to protect the deeper structures, the thickened skin can increase pressure and pain.    CALLUS: Flat, diffuse thickening are simple calluses and they are usually caused by friction. Often these are the result of rubbing on a shoe or going barefoot.    CORNS: Calluses with a central core between the toes are called corns. These result from prominent joints on adjacent toes rubbing together. Theses are a symptom of bone malalignment and will always recur unless the underlying bones are addressed surgically.    IPKs: Calluses with a central core on the ball of the foot are usually IPKs (intractable plantar keratosis). These are caused by excessive pressure from the metatarsals, the bones that make up the ball of the foot. Often one of these bones is too long or too prominent.  Again, these will always recur unless the underlying bone issue is addressed. There is no cure for these. They will either go away by themselves, recur, or more could develop.    ROUTINE MAINTENANCE  1. File them down with a pumice stone or callus file a couple times a week.   2. An electric callus removing device. Amope Pedi Perfect Electronic Pedicure Foot File and Callus Remover can be a good option.   3. Lotion can be applied to soften the callus. A urea based cream such as Kersal or Vanicream or thicker cream with shea butter are good options.  4. Toe spacers or toe covers can be used for corns, gel pads can be used for other lesions on the bottom of the foot.   If there is a surgical pathology noted, such as a prominent bone, often this needs to be addressed surgically to minimize recurrence. However, sometimes the lesion  simply migrates to another spot after surgery, so it is not a guaranteed cure.     **If you come back to clinic for treatment, insurance does not cover it, and you would be billed. This charge could range from $100 - $227**       Follow up as needed

## 2025-04-12 ENCOUNTER — HEALTH MAINTENANCE LETTER (OUTPATIENT)
Age: 55
End: 2025-04-12